# Patient Record
Sex: FEMALE | Race: WHITE | Employment: OTHER
[De-identification: names, ages, dates, MRNs, and addresses within clinical notes are randomized per-mention and may not be internally consistent; named-entity substitution may affect disease eponyms.]

---

## 2017-07-15 ENCOUNTER — HEALTH MAINTENANCE LETTER (OUTPATIENT)
Age: 47
End: 2017-07-15

## 2018-01-20 ENCOUNTER — HEALTH MAINTENANCE LETTER (OUTPATIENT)
Age: 48
End: 2018-01-20

## 2018-02-20 ENCOUNTER — OFFICE VISIT (OUTPATIENT)
Dept: FAMILY MEDICINE | Facility: CLINIC | Age: 48
End: 2018-02-20
Payer: COMMERCIAL

## 2018-02-20 VITALS
HEART RATE: 66 BPM | HEIGHT: 65 IN | OXYGEN SATURATION: 99 % | DIASTOLIC BLOOD PRESSURE: 72 MMHG | WEIGHT: 234.9 LBS | SYSTOLIC BLOOD PRESSURE: 128 MMHG | TEMPERATURE: 98.3 F | BODY MASS INDEX: 39.14 KG/M2

## 2018-02-20 DIAGNOSIS — Z01.818 PREOP GENERAL PHYSICAL EXAM: Primary | ICD-10-CM

## 2018-02-20 DIAGNOSIS — N62 LARGE BREASTS: ICD-10-CM

## 2018-02-20 LAB — HGB BLD-MCNC: 12.9 G/DL (ref 11.7–15.7)

## 2018-02-20 PROCEDURE — 36415 COLL VENOUS BLD VENIPUNCTURE: CPT | Performed by: NURSE PRACTITIONER

## 2018-02-20 PROCEDURE — 99214 OFFICE O/P EST MOD 30 MIN: CPT | Performed by: NURSE PRACTITIONER

## 2018-02-20 PROCEDURE — 85018 HEMOGLOBIN: CPT | Performed by: NURSE PRACTITIONER

## 2018-02-20 NOTE — PROGRESS NOTES
[facility]  [address] [place] [id number] [phone]  Dept: [phone]    PRE-OP EVALUATION:  Today's date: 2018    Nina Sommers (: 1970) presents for pre-operative evaluation assessment as requested by Dr. crandall.  She requires evaluation and anesthesia risk assessment prior to undergoing surgery/procedure for treatment of  Plastic surgery    [phone]  Primary Physician: Marilee Centeno  Type of Anesthesia Anticipated: General    Patient has a Health Care Directive or Living Will:  YES     Preop Questions 2018   Who is doing your surgery? Dr Crandall   What are you having done? plastic surgery   Date of Surgery/Procedure:    Facility or Hospital where procedure/surgery will be performed: [facility]   1.  Do you have a history of Heart attack, stroke, stent, coronary bypass surgery, or other heart surgery? No   2.  Do you ever have any pain or discomfort in your chest? No   3.  Do you have a history of  Heart Failure? No   4.   Are you troubled by shortness of breath when:  walking on a level surface, or up a slight hill, or at night? No   5.  Do you currently have a cold, bronchitis or other respiratory infection? No   6.  Do you have a cough, shortness of breath, or wheezing? No   7.  Do you sometimes get pains in the calves of your legs when you walk? No   8. Do you or anyone in your family have previous history of blood clots? No   9.  Do you or does anyone in your family have a serious bleeding problem such as prolonged bleeding following surgeries or cuts? No   10. Have you ever had problems with anemia or been told to take iron pills? No   11. Have you had any abnormal blood loss such as black, tarry or bloody stools, or abnormal vaginal bleeding? No   12. Have you ever had a blood transfusion? No   13. Have you or any of your relatives ever had problems with anesthesia? No   14. Do you have sleep apnea, excessive snoring or  "daytime drowsiness? No   15. Do you have any prosthetic heart valves? No   16. Do you have prosthetic joints? No   17. Is there any chance that you may be pregnant? No         HPI:     HPI related to upcoming procedure: breast augmentation          MEDICAL HISTORY:     Patient Active Problem List    Diagnosis Date Noted     Family history of coronary artery disease 11/12/2012     Priority: Medium     Hyperlipidemia LDL goal <160 11/12/2012     Priority: Medium     Obesity 11/12/2012     Priority: Medium      History reviewed. No pertinent past medical history.  History reviewed. No pertinent surgical history.  No current outpatient prescriptions on file.     OTC products: None, except as noted above    No Known Allergies   Latex Allergy: NO    Social History   Substance Use Topics     Smoking status: Never Smoker     Smokeless tobacco: Never Used     Alcohol use Yes      Comment: rare     History   Drug Use No       REVIEW OF SYSTEMS:   Constitutional, HEENT, cardiovascular, pulmonary, gi and gu systems are negative, except as otherwise noted.    EXAM:   /72 (BP Location: Right arm, Patient Position: Chair, Cuff Size: Adult Large)  Pulse 66  Temp 98.3  F (36.8  C) (Oral)  Ht 5' 5\" (1.651 m)  Wt 234 lb 14.4 oz (106.5 kg)  LMP 02/16/2018 (Exact Date)  SpO2 99%  Breastfeeding? No  BMI 39.09 kg/m2    GENERAL APPEARANCE: healthy, alert and no distress     EYES: EOMI, PERRL     HENT: ear canals and TM's normal and nose and mouth without ulcers or lesions     NECK: no adenopathy, no asymmetry, masses, or scars and thyroid normal to palpation     RESP: lungs clear to auscultation - no rales, rhonchi or wheezes     CV: regular rates and rhythm, normal S1 S2, no S3 or S4 and no murmur, click or rub     ABDOMEN:  soft, nontender, no HSM or masses and bowel sounds normal     MS: extremities normal- no gross deformities noted, no evidence of inflammation in joints, FROM in all extremities.     SKIN: no suspicious " lesions or rashes     NEURO: Normal strength and tone, sensory exam grossly normal, mentation intact and speech normal     PSYCH: mentation appears normal. and affect normal/bright     LYMPHATICS: No cervical adenopathy    DIAGNOSTICS:   Hemoglobin drawn today.     Recent Labs   Lab Test  03/22/15   1243  11/12/12   1035   HGB  13.4  13.1   PLT  249  282   NA  140  138   POTASSIUM  3.9  4.5   CR  0.97  0.92        IMPRESSION:   Diagnosis/reason for consult: breast augmentation.     The proposed surgical procedure is considered INTERMEDIATE risk.    REVISED CARDIAC RISK INDEX  The patient has the following serious cardiovascular risks for perioperative complications such as (MI, PE, VFib and 3  AV Block):  No serious cardiac risks  INTERPRETATION: 1 risks: Class II (low risk - 0.9% complication rate)    The patient has the following additional risks for perioperative complications:  No identified additional risks      ICD-10-CM    1. Preop general physical exam Z01.818 Hemoglobin   2. Large breasts N62        RECOMMENDATIONS:         --Patient is to take all scheduled medications on the day of surgery EXCEPT for modifications listed below.    APPROVAL GIVEN to proceed with proposed procedure, without further diagnostic evaluation       Signed Electronically by: Marilee Centeno NP    Copy of this evaluation report is provided to requesting physician.    [place] Preop Guidelines

## 2018-02-20 NOTE — NURSING NOTE
"Chief Complaint   Patient presents with     Pre-Op Exam       Initial /72 (BP Location: Right arm, Patient Position: Chair, Cuff Size: Adult Large)  Pulse 66  Temp 98.3  F (36.8  C) (Oral)  Ht 5' 5\" (1.651 m)  Wt 234 lb 14.4 oz (106.5 kg)  LMP 02/16/2018 (Exact Date)  SpO2 99%  Breastfeeding? No  BMI 39.09 kg/m2 Estimated body mass index is 39.09 kg/(m^2) as calculated from the following:    Height as of this encounter: 5' 5\" (1.651 m).    Weight as of this encounter: 234 lb 14.4 oz (106.5 kg).  Medication Reconciliation: complete   Mally Berrios SMA      "

## 2018-02-20 NOTE — NURSING NOTE
Faxed 8 page with labs and ov to [phone] [facility] on 2/19/2018 at 4pm.Guzman Li CMA

## 2018-02-20 NOTE — MR AVS SNAPSHOT
After Visit Summary   2/20/2018    Nina Sommers    MRN: [id number]           Patient Information     Date Of Birth          1970        Visit Information        Provider Department      2/20/2018 11:00 AM Marilee Centeno NP [facility]        Today's Diagnoses     Preop general physical exam    -  1      Care Instructions      Before Your Surgery      Call your surgeon if there is any change in your health. This includes signs of a cold or flu (such as a sore throat, runny nose, cough, rash or fever).    Do not smoke, drink alcohol or take over the counter medicine (unless your surgeon or primary care doctor tells you to) for the 24 hours before and after surgery.    If you take prescribed drugs: Follow your doctor s orders about which medicines to take and which to stop until after surgery.    Eating and drinking prior to surgery: follow the instructions from your surgeon    Take a shower or bath the night before surgery. Use the soap your surgeon gave you to gently clean your skin. If you do not have soap from your surgeon, use your regular soap. Do not shave or scrub the surgery site.  Wear clean pajamas and have clean sheets on your bed.           Follow-ups after your visit        Who to contact     If you have questions or need follow up information about today's clinic visit or your schedule please contact [facility] directly at [phone].  Normal or non-critical lab and imaging results will be communicated to you by MyChart, letter or phone within 4 business days after the clinic has received the results. If you do not hear from us within 7 days, please contact the clinic through MyChart or phone. If you have a critical or abnormal lab result, we will notify you by phone as soon as possible.  Submit refill requests through Bill Me Later or call your pharmacy and they will forward the refill request to us. Please allow 3 business days for your refill to be  "completed.          Additional Information About Your Visit        InTownhart Information     [employer] gives you secure access to your electronic health record. If you see a primary care provider, you can also send messages to your care team and make appointments. If you have questions, please call your primary care clinic.  If you do not have a primary care provider, please call [phone] and they will assist you.        Care EveryWhere ID     This is your Care EveryWhere ID. This could be used by other organizations to access your [place] medical records  [id number]        Your Vitals Were     Pulse Temperature Height Last Period Pulse Oximetry Breastfeeding?    66 98.3  F (36.8  C) (Oral) 5' 5\" (1.651 m) 02/16/2018 (Exact Date) 99% No    BMI (Body Mass Index)                   39.09 kg/m2            Blood Pressure from Last 3 Encounters:   02/20/18 128/72   03/22/15 128/74   11/12/12 126/72    Weight from Last 3 Encounters:   02/20/18 234 lb 14.4 oz (106.5 kg)   03/22/15 238 lb (108 kg)   11/12/12 235 lb (106.6 kg)              We Performed the Following     Hemoglobin        Primary Care Provider Office Phone # Fax #    Marilee Centeno [phone]       [facility], [place] [address] [facility] [address]        Equal Access to Services     DAREK FOWLER : Hadii aad ku hadasho Soomaali, waaxda luqadaha, qaybta kaalmada adeegyada, fabi wilson. So [facility] [phone].    ATENCIÓN: Si habla español, tiene a maardiaga disposición servicios gratuitos de asistencia lingüística. Llame al [phone].    We comply with applicable federal civil rights laws and [place] laws. We do not discriminate on the basis of race, color, national origin, age, disability, sex, sexual orientation, or gender identity.            Thank you!     Thank you for choosing [facility]  for your care. Our goal is always to provide you with excellent care. Hearing back from our " patients is one way we can continue to improve our services. Please take a few minutes to complete the written survey that you may receive in the mail after your visit with us. Thank you!             Your Updated Medication List - Protect others around you: Learn how to safely use, store and throw away your medicines at www.disposemymeds.org.      Notice  As of 2/20/2018 11:31 AM    You have not been prescribed any medications.

## 2018-04-30 ENCOUNTER — OFFICE VISIT (OUTPATIENT)
Dept: URGENT CARE | Facility: URGENT CARE | Age: 48
End: 2018-04-30
Payer: COMMERCIAL

## 2018-04-30 VITALS
OXYGEN SATURATION: 98 % | HEART RATE: 84 BPM | SYSTOLIC BLOOD PRESSURE: 139 MMHG | TEMPERATURE: 98.3 F | DIASTOLIC BLOOD PRESSURE: 90 MMHG

## 2018-04-30 DIAGNOSIS — M54.50 ACUTE LEFT-SIDED LOW BACK PAIN WITHOUT SCIATICA: Primary | ICD-10-CM

## 2018-04-30 PROCEDURE — 99213 OFFICE O/P EST LOW 20 MIN: CPT | Performed by: PHYSICIAN ASSISTANT

## 2018-04-30 RX ORDER — CYCLOBENZAPRINE HCL 10 MG
10 TABLET ORAL 3 TIMES DAILY PRN
Qty: 30 TABLET | Refills: 1 | Status: SHIPPED | OUTPATIENT
Start: 2018-04-30 | End: 2022-09-16

## 2018-04-30 NOTE — MR AVS SNAPSHOT
After Visit Summary   4/30/2018    Nina Sommers    MRN: [id number]           Patient Information     Date Of Birth          1970        Visit Information        Provider Department      4/30/2018 7:45 PM Alla Hawkins PA-C [facility]        Today's Diagnoses     Acute left-sided low back pain without sciatica    -  1       Follow-ups after your visit        Who to contact     If you have questions or need follow up information about today's clinic visit or your schedule please contact [facility] directly at [phone].  Normal or non-critical lab and imaging results will be communicated to you by Long Playhart, letter or phone within 4 business days after the clinic has received the results. If you do not hear from us within 7 days, please contact the clinic through SandForcet or phone. If you have a critical or abnormal lab result, we will notify you by phone as soon as possible.  Submit refill requests through [employer]d or call your pharmacy and they will forward the refill request to us. Please allow 3 business days for your refill to be completed.          Additional Information About Your Visit        MyChart Information     [employer] gives you secure access to your electronic health record. If you see a primary care provider, you can also send messages to your care team and make appointments. If you have questions, please call your primary care clinic.  If you do not have a primary care provider, please call [phone] and they will assist you.        Care EveryWhere ID     This is your Care EveryWhere ID. This could be used by other organizations to access your [place] medical records  [id number]        Your Vitals Were     Pulse Temperature Pulse Oximetry             84 98.3  F (36.8  C) (Oral) 98%          Blood Pressure from Last 3 Encounters:   04/30/18 139/90   02/20/18 128/72   03/22/15 128/74    Weight from Last 3 Encounters:   02/20/18 234  lb 14.4 oz (106.5 kg)   03/22/15 238 lb (108 kg)   11/12/12 235 lb (106.6 kg)              Today, you had the following     No orders found for display         Today's Medication Changes          These changes are accurate as of 4/30/18 10:09 PM.  If you have any questions, ask your nurse or doctor.               Start taking these medicines.        Dose/Directions    cyclobenzaprine 10 MG tablet   Commonly known as:  FLEXERIL   Used for:  Acute left-sided low back pain without sciatica        Dose:  10 mg   Take 1 tablet (10 mg) by mouth 3 times daily as needed for muscle spasms   Quantity:  30 tablet   Refills:  1            Where to get your medicines      These medications were sent to [facility] [address] [place] [address] [facility] [address] [address] [facility] [address]     Phone:  [phone]     cyclobenzaprine 10 MG tablet                Primary Care Provider Office Phone # Fax #    Marileelaura Centeno [phone]       [facility] [address] [address] [facility] [address]        Equal Access to Services     DAREK FOWLER AH: Hadii aad ku hadasho Soomaali, waaxda luqadaha, qaybta kaalmada adeegyada, waxay lacy haydeen arielle buitrago . So [facility] [phone].    ATENCIÓN: Si habla español, tiene a maradiaga disposición servicios gratuitos de asistencia lingüística. Llame al [phone].    We comply with applicable federal civil rights laws and [place] laws. We do not discriminate on the basis of race, color, national origin, age, disability, sex, sexual orientation, or gender identity.            Thank you!     Thank you for choosing [facility]  for your care. Our goal is always to provide you with excellent care. Hearing back from our patients is one way we can continue to improve our services. Please take a few minutes to complete the written survey that you may receive in the mail after your visit with us. Thank you!              Your Updated Medication List - Protect others around you: Learn how to safely use, store and throw away your medicines at www.disposemymeds.org.          This list is accurate as of 4/30/18 10:09 PM.  Always use your most recent med list.                   Brand Name Dispense Instructions for use Diagnosis    cyclobenzaprine 10 MG tablet    FLEXERIL    30 tablet    Take 1 tablet (10 mg) by mouth 3 times daily as needed for muscle spasms    Acute left-sided low back pain without sciatica

## 2018-05-01 NOTE — PROGRESS NOTES
SUBJECTIVE:   Nina Sommers is a 48 year old female presenting with a chief complaint of   Chief Complaint   Patient presents with     Urgent Care     Musculoskeletal Problem     Low back pain started on 18- muscle tightness more on L side       She is an established patient of [facility].    Patient presenting to urgent care [facility] with a complaint of left-sided low back pain that started 5 days ago. She notes muscle tightness on the lower left back.  She denies any injury or trauma.  Has tried some heating pad.  Earlier today notes that she was not able to get out of bed due to the muscle tightness.  Currently no symptoms.  She has been stretching.  Denies any loss of bowel or bladder function.  Reports  occasional numbness left lower leg.    Review of Systems   Positive for left-sided back pain  Positive for numbness occasional(left lower leg.)    No past medical history on file.  Family History   Problem Relation Age of Onset     Myocardial Infarction Mother       from heart attack at age of 73     DIABETES Mother      type II     Family History Negative Father      macular degeneration     Breast Cancer Maternal Grandmother      in her 70's      Family History Negative Sister      2-doing well     Cancer - colorectal Other      paternal aunt,  from this in her 60-70's-dad's sister     Current Outpatient Prescriptions   Medication Sig Dispense Refill     cyclobenzaprine (FLEXERIL) 10 MG tablet Take 1 tablet (10 mg) by mouth 3 times daily as needed for muscle spasms 30 tablet 1     Social History   Substance Use Topics     Smoking status: Never Smoker     Smokeless tobacco: Never Used     Alcohol use Yes      Comment: rare       OBJECTIVE  /90 (BP Location: Right arm, Patient Position: Chair, Cuff Size: Adult Large)  Pulse 84  Temp 98.3  F (36.8  C) (Oral)  SpO2 98%    Physical Exam  General appearance: 48-year-old female in no acute distress  Lungs are clear to auscultation  bilaterally.  Chest with normal heart tones S1-S2.  Back exam: no gross deformities, swelling or ecchymosis noted, currently no tenderness to palpation elicited, range of motion is appropriate, strength is normal,  hip extension and flexion is appropriate, gait is normal.  No tenderness to palpation over the lumbar sacral spine.    Labs:  No results found for this or any previous visit (from the past 24 hour(s)).    X-Ray was not done.    ASSESSMENT:      ICD-10-CM    1. Acute left-sided low back pain without sciatica M54.5 cyclobenzaprine (FLEXERIL) 10 MG tablet        Medical Decision Making:    Differential Diagnosis:  Low back pain    Serious Comorbid Conditions:  none    PLAN:  Acute left-sided low back pain: Patient currently not experiencing any symptoms in urgent care.  A prescription of Flexeril is written for her to take as needed should symptoms reoccur.  Follow-up sooner if any worsening symptoms.  Patient understands and agrees with the plan.      Followup:    If not improving or if condition worsens, follow up with your Primary Care Provider    There are no Patient Instructions on file for this visit.

## 2018-05-07 ENCOUNTER — TELEPHONE (OUTPATIENT)
Dept: FAMILY MEDICINE | Facility: CLINIC | Age: 48
End: 2018-05-07

## 2018-05-07 NOTE — TELEPHONE ENCOUNTER
Panel Management Review      Patient has the following on her problem list: None      Composite cancer screening  Chart review shows that this patient is due/due soon for the following Pap Smear and Mammogram  Summary:    Patient is due/failing the following:   MAMMOGRAM and PAP    Action needed:   Patient needs office visit for physical w/pap. and Patient needs referral/order: mammogram    Type of outreach:    Phone, left message for patient to call back.     Questions for provider review:    None                                                                                                                                    ENRICO Latham       Chart routed to  .

## 2018-07-22 ENCOUNTER — HEALTH MAINTENANCE LETTER (OUTPATIENT)
Age: 48
End: 2018-07-22

## 2019-02-15 ENCOUNTER — HEALTH MAINTENANCE LETTER (OUTPATIENT)
Age: 49
End: 2019-02-15

## 2019-11-03 ENCOUNTER — HEALTH MAINTENANCE LETTER (OUTPATIENT)
Age: 49
End: 2019-11-03

## 2020-03-09 ENCOUNTER — ANCILLARY PROCEDURE (OUTPATIENT)
Dept: GENERAL RADIOLOGY | Facility: CLINIC | Age: 50
End: 2020-03-09
Attending: PHYSICIAN ASSISTANT
Payer: COMMERCIAL

## 2020-03-09 ENCOUNTER — ANCILLARY PROCEDURE (OUTPATIENT)
Dept: MAMMOGRAPHY | Facility: CLINIC | Age: 50
End: 2020-03-09
Payer: COMMERCIAL

## 2020-03-09 ENCOUNTER — OFFICE VISIT (OUTPATIENT)
Dept: FAMILY MEDICINE | Facility: CLINIC | Age: 50
End: 2020-03-09
Payer: COMMERCIAL

## 2020-03-09 VITALS
SYSTOLIC BLOOD PRESSURE: 132 MMHG | HEIGHT: 68 IN | TEMPERATURE: 97.6 F | OXYGEN SATURATION: 98 % | HEART RATE: 69 BPM | BODY MASS INDEX: 34.74 KG/M2 | DIASTOLIC BLOOD PRESSURE: 83 MMHG | WEIGHT: 229.2 LBS

## 2020-03-09 DIAGNOSIS — M25.562 ACUTE PAIN OF LEFT KNEE: ICD-10-CM

## 2020-03-09 DIAGNOSIS — Z12.31 VISIT FOR SCREENING MAMMOGRAM: ICD-10-CM

## 2020-03-09 DIAGNOSIS — Z00.00 ROUTINE GENERAL MEDICAL EXAMINATION AT A HEALTH CARE FACILITY: Primary | ICD-10-CM

## 2020-03-09 DIAGNOSIS — Z13.0 SCREENING FOR BLOOD DISEASE: ICD-10-CM

## 2020-03-09 DIAGNOSIS — Z13.1 SCREENING FOR DIABETES MELLITUS: ICD-10-CM

## 2020-03-09 DIAGNOSIS — E78.5 HYPERLIPIDEMIA LDL GOAL <160: ICD-10-CM

## 2020-03-09 PROCEDURE — 90471 IMMUNIZATION ADMIN: CPT | Performed by: PHYSICIAN ASSISTANT

## 2020-03-09 PROCEDURE — 77067 SCR MAMMO BI INCL CAD: CPT | Mod: TC

## 2020-03-09 PROCEDURE — 99396 PREV VISIT EST AGE 40-64: CPT | Mod: 25 | Performed by: PHYSICIAN ASSISTANT

## 2020-03-09 PROCEDURE — 77063 BREAST TOMOSYNTHESIS BI: CPT | Mod: TC

## 2020-03-09 PROCEDURE — 90715 TDAP VACCINE 7 YRS/> IM: CPT | Performed by: PHYSICIAN ASSISTANT

## 2020-03-09 PROCEDURE — 73562 X-RAY EXAM OF KNEE 3: CPT | Mod: LT

## 2020-03-09 ASSESSMENT — ENCOUNTER SYMPTOMS
CONSTIPATION: 0
FEVER: 0
NERVOUS/ANXIOUS: 0
CHILLS: 0
DIARRHEA: 0
ABDOMINAL PAIN: 0
EYE PAIN: 0
HEMATURIA: 0
FREQUENCY: 0
HEMATOCHEZIA: 0
DIZZINESS: 0
COUGH: 0

## 2020-03-09 ASSESSMENT — MIFFLIN-ST. JEOR: SCORE: 1723.14

## 2020-03-09 NOTE — PROGRESS NOTES
Prior to immunization administration, verified patients identity using patient s name and date of birth. Please see Immunization Activity for additional information.     Screening Questionnaire for Adult Immunization    Are you sick today?   No   Do you have allergies to medications, food, a vaccine component or latex?   No   Have you ever had a serious reaction after receiving a vaccination?   No   Do you have a long-term health problem with heart, lung, kidney, or metabolic disease (e.g., diabetes), asthma, a blood disorder, no spleen, complement component deficiency, a cochlear implant, or a spinal fluid leak?  Are you on long-term aspirin therapy?   No   Do you have cancer, leukemia, HIV/AIDS, or any other immune system problem?   No   Do you have a parent, brother, or sister with an immune system problem?   No   In the past 3 months, have you taken medications that affect  your immune system, such as prednisone, other steroids, or anticancer drugs; drugs for the treatment of rheumatoid arthritis, Crohn s disease, or psoriasis; or have you had radiation treatments?   No   Have you had a seizure, or a brain or other nervous system problem?   No   During the past year, have you received a transfusion of blood or blood    products, or been given immune (gamma) globulin or antiviral drug?   No   For women: Are you pregnant or is there a chance you could become       pregnant during the next month?   No   Have you received any vaccinations in the past 4 weeks?   No     Immunization questionnaire answers were all negative.        Per orders of Dr. Nath, injection of tdap given by Yolanda Gardner CMA. Patient instructed to remain in clinic for 15 minutes afterwards, and to report any adverse reaction to me immediately.       Screening performed by Yolanda Gardner CMA on 3/9/2020 at 9:14 AM.

## 2020-03-09 NOTE — PROGRESS NOTES
SUBJECTIVE:   CC: Nina Sommers is an 49 year old woman who presents for preventive health visit.     Healthy Habits:     Getting at least 3 servings of Calcium per day:  Yes    Bi-annual eye exam:  NO    Dental care twice a year:  Yes    Sleep apnea or symptoms of sleep apnea:  None    Diet:  Carbohydrate counting and Breakfast skipped    Duration of exercise:  30-45 minutes    Taking medications regularly:  Yes    Medication side effects:  None    PHQ-2 Total Score: 0    Additional concerns today:  Yes           left knee pain  - nerve pain    Today's PHQ-2 Score:   PHQ-2 ( 1999 [employer]) 3/9/2020   Q1: Little interest or pleasure in doing things 0   Q2: Feeling down, depressed or hopeless 0   PHQ-2 Score 0   Q1: Little interest or pleasure in doing things Not at all   Q2: Feeling down, depressed or hopeless Not at all   PHQ-2 Score 0       Abuse: Current or Past(Physical, Sexual or Emotional)- No  Do you feel safe in your environment? Yes        Social History     Tobacco Use     Smoking status: Never Smoker     Smokeless tobacco: Never Used   Substance Use Topics     Alcohol use: Yes     Comment: rare     If you drink alcohol do you typically have >3 drinks per day or >7 drinks per week? No    Alcohol Use 3/9/2020   Prescreen: >3 drinks/day or >7 drinks/week? No   Prescreen: >3 drinks/day or >7 drinks/week? -   No flowsheet data found.    Reviewed orders with patient.  Reviewed health maintenance and updated orders accordingly - Yes  BP Readings from Last 3 Encounters:   03/09/20 132/83   04/30/18 139/90   02/20/18 128/72    Wt Readings from Last 3 Encounters:   03/09/20 104 kg (229 lb 3.2 oz)   02/20/18 106.5 kg (234 lb 14.4 oz)   03/22/15 108 kg (238 lb)                  No Known Allergies  Recent Labs   Lab Test 03/22/15  1243 11/12/12  1035   LDL  --  83   HDL  --  54   TRIG  --  54   ALT 48 21   CR 0.97 0.92   GFRESTIMATED 63 68   GFRESTBLACK 76 82   POTASSIUM 3.9 4.5   TSH  --  2.08        Mammogram  Screening: Patient under age 50, mutual decision reflected in health maintenance.      Pertinent mammograms are reviewed under the imaging tab.  History of abnormal Pap smear: NO - age 30-65 PAP every 5 years with negative HPV co-testing recommended  PAP / HPV 11/12/2012   PAP NIL     Reviewed and updated as needed this visit by clinical staff         Reviewed and updated as needed this visit by Provider            Review of Systems  CONSTITUTIONAL: NEGATIVE for fever, chills, change in weight  INTEGUMENTARU/SKIN: NEGATIVE for worrisome rashes, moles or lesions  EYES: NEGATIVE for vision changes or irritation  ENT: NEGATIVE for ear, mouth and throat problems  RESP: NEGATIVE for significant cough or SOB  BREAST: NEGATIVE for masses, tenderness or discharge  CV: NEGATIVE for chest pain, palpitations or peripheral edema  GI: NEGATIVE for nausea, abdominal pain, heartburn, or change in bowel habits  : NEGATIVE for unusual urinary or vaginal symptoms. Periods are regular.  MUSCULOSKELETAL: NEGATIVE for significant arthralgias or myalgia  NEURO: NEGATIVE for weakness, dizziness or paresthesias  PSYCHIATRIC: NEGATIVE for changes in mood or affect     OBJECTIVE:   There were no vitals taken for this visit.  Physical Exam  GENERAL: healthy, alert and no distress  EYES: Eyes grossly normal to inspection, PERRL and conjunctivae and sclerae normal  HENT: ear canals and TM's normal, nose and mouth without ulcers or lesions  NECK: no adenopathy, no asymmetry, masses, or scars and thyroid normal to palpation  RESP: lungs clear to auscultation - no rales, rhonchi or wheezes  BREAST: normal without masses, tenderness or nipple discharge and no palpable axillary masses or adenopathy  CV: regular rate and rhythm, normal S1 S2, no S3 or S4, no murmur, click or rub, no peripheral edema and peripheral pulses strong  ABDOMEN: soft, nontender, no hepatosplenomegaly, no masses and bowel sounds normal  MS: no gross musculoskeletal defects  "noted, no edema  SKIN: no suspicious lesions or rashes  NEURO: Normal strength and tone, mentation intact and speech normal  PSYCH: mentation appears normal, affect normal/bright  LYMPH: no cervical, supraclavicular, axillary, or inguinal adenopathy    Diagnostic Test Results:  Labs reviewed in Epic    ASSESSMENT/PLAN:   1. Routine general medical examination at a health care facility      2. Hyperlipidemia LDL goal <160    - Lipid panel reflex to direct LDL Fasting; Future    3. Screening for blood disease    - CBC with platelets; Future    4. Screening for diabetes mellitus    - Comprehensive metabolic panel; Future    5. Acute pain of left knee  Xray is within normal limits      - XR Knee Left 3 Views; Future    COUNSELING:  Reviewed preventive health counseling, as reflected in patient instructions       Regular exercise       Healthy diet/nutrition       Vision screening       Hearing screening       (Magi)menopause management    Estimated body mass index is 39.09 kg/m  as calculated from the following:    Height as of 2/20/18: 1.651 m (5' 5\").    Weight as of 2/20/18: 106.5 kg (234 lb 14.4 oz).    Weight management plan: Discussed healthy diet and exercise guidelines     reports that she has never smoked. She has never used smokeless tobacco.      Counseling Resources:  ATP IV Guidelines  Pooled Cohorts Equation Calculator  Breast Cancer Risk Calculator  FRAX Risk Assessment  ICSI Preventive Guidelines  Dietary Guidelines for Americans, 2010  USDA's MyPlate  ASA Prophylaxis  Lung CA Screening    Ramona Ann Aaseby-Aguilera, PA-C  [facility]  "

## 2020-04-02 ENCOUNTER — OFFICE VISIT (OUTPATIENT)
Dept: FAMILY MEDICINE | Facility: CLINIC | Age: 50
End: 2020-04-02
Payer: COMMERCIAL

## 2020-04-02 VITALS
DIASTOLIC BLOOD PRESSURE: 80 MMHG | BODY MASS INDEX: 34.56 KG/M2 | OXYGEN SATURATION: 99 % | HEART RATE: 73 BPM | TEMPERATURE: 98.1 F | HEIGHT: 68 IN | SYSTOLIC BLOOD PRESSURE: 122 MMHG | WEIGHT: 228 LBS

## 2020-04-02 DIAGNOSIS — Z11.51 SCREENING FOR HUMAN PAPILLOMAVIRUS: Primary | ICD-10-CM

## 2020-04-02 DIAGNOSIS — Z13.1 SCREENING FOR DIABETES MELLITUS: ICD-10-CM

## 2020-04-02 DIAGNOSIS — E78.5 HYPERLIPIDEMIA LDL GOAL <160: ICD-10-CM

## 2020-04-02 DIAGNOSIS — Z13.0 SCREENING FOR BLOOD DISEASE: ICD-10-CM

## 2020-04-02 LAB
ALBUMIN SERPL-MCNC: 3.5 G/DL (ref 3.4–5)
ALP SERPL-CCNC: 57 U/L (ref 40–150)
ALT SERPL W P-5'-P-CCNC: 24 U/L (ref 0–50)
ANION GAP SERPL CALCULATED.3IONS-SCNC: 2 MMOL/L (ref 3–14)
AST SERPL W P-5'-P-CCNC: 14 U/L (ref 0–45)
BILIRUB SERPL-MCNC: 0.6 MG/DL (ref 0.2–1.3)
BUN SERPL-MCNC: 13 MG/DL (ref 7–30)
CALCIUM SERPL-MCNC: 8.2 MG/DL (ref 8.5–10.1)
CHLORIDE SERPL-SCNC: 108 MMOL/L (ref 94–109)
CHOLEST SERPL-MCNC: 179 MG/DL
CO2 SERPL-SCNC: 28 MMOL/L (ref 20–32)
CREAT SERPL-MCNC: 0.95 MG/DL (ref 0.52–1.04)
ERYTHROCYTE [DISTWIDTH] IN BLOOD BY AUTOMATED COUNT: 13.7 % (ref 10–15)
GFR SERPL CREATININE-BSD FRML MDRD: 71 ML/MIN/{1.73_M2}
GLUCOSE SERPL-MCNC: 100 MG/DL (ref 70–99)
HCT VFR BLD AUTO: 40.2 % (ref 35–47)
HDLC SERPL-MCNC: 55 MG/DL
HGB BLD-MCNC: 13.3 G/DL (ref 11.7–15.7)
LDLC SERPL CALC-MCNC: 114 MG/DL
MCH RBC QN AUTO: 29.8 PG (ref 26.5–33)
MCHC RBC AUTO-ENTMCNC: 33.1 G/DL (ref 31.5–36.5)
MCV RBC AUTO: 90 FL (ref 78–100)
NONHDLC SERPL-MCNC: 124 MG/DL
PLATELET # BLD AUTO: 264 10E9/L (ref 150–450)
POTASSIUM SERPL-SCNC: 4.5 MMOL/L (ref 3.4–5.3)
PROT SERPL-MCNC: 7.1 G/DL (ref 6.8–8.8)
RBC # BLD AUTO: 4.47 10E12/L (ref 3.8–5.2)
SODIUM SERPL-SCNC: 138 MMOL/L (ref 133–144)
TRIGL SERPL-MCNC: 52 MG/DL
WBC # BLD AUTO: 6.2 10E9/L (ref 4–11)

## 2020-04-02 PROCEDURE — 87624 HPV HI-RISK TYP POOLED RSLT: CPT | Performed by: PHYSICIAN ASSISTANT

## 2020-04-02 PROCEDURE — 99213 OFFICE O/P EST LOW 20 MIN: CPT | Performed by: PHYSICIAN ASSISTANT

## 2020-04-02 PROCEDURE — G0145 SCR C/V CYTO,THINLAYER,RESCR: HCPCS | Performed by: PHYSICIAN ASSISTANT

## 2020-04-02 PROCEDURE — 80053 COMPREHEN METABOLIC PANEL: CPT | Performed by: PHYSICIAN ASSISTANT

## 2020-04-02 PROCEDURE — 80061 LIPID PANEL: CPT | Performed by: PHYSICIAN ASSISTANT

## 2020-04-02 PROCEDURE — 85027 COMPLETE CBC AUTOMATED: CPT | Performed by: PHYSICIAN ASSISTANT

## 2020-04-02 PROCEDURE — 36415 COLL VENOUS BLD VENIPUNCTURE: CPT | Performed by: PHYSICIAN ASSISTANT

## 2020-04-02 ASSESSMENT — MIFFLIN-ST. JEOR: SCORE: 1717.7

## 2020-04-02 NOTE — PROGRESS NOTES
Subjective     PCP wore a mask and gloves.  Patient unmasked      Nina Sommers is a 49 year old female who presents to clinic today for the following health issues:    HPI    Pap only.  Hasnt had pap in years.  Had one abnormal 20 years ago but didn't need anything other than repeat pap and was normal then .          Current Outpatient Medications   Medication Sig Dispense Refill     cyclobenzaprine (FLEXERIL) 10 MG tablet Take 1 tablet (10 mg) by mouth 3 times daily as needed for muscle spasms (Patient not taking: Reported on 3/9/2020) 30 tablet 1     BP Readings from Last 3 Encounters:   04/02/20 122/80   03/09/20 132/83   04/30/18 139/90    Wt Readings from Last 3 Encounters:   04/02/20 103.4 kg (228 lb)   03/09/20 104 kg (229 lb 3.2 oz)   02/20/18 106.5 kg (234 lb 14.4 oz)                      Reviewed and updated as needed this visit by Provider         Review of Systems   ROS COMP: Constitutional, HEENT, cardiovascular, pulmonary, gi and gu systems are negative, except as otherwise noted.      Objective    LMP 03/08/2020   There is no height or weight on file to calculate BMI.  Physical Exam   GENERAL: healthy, alert and no distress  RESP: lungs clear to auscultation - no rales, rhonchi or wheezes  CV: regular rate and rhythm, normal S1 S2, no S3 or S4, no murmur, click or rub, no peripheral edema and peripheral pulses strong   (female): normal female external genitalia, normal urethral meatus, vaginal mucosa, normal cervix/adnexa/uterus without masses or discharge    Diagnostic Test Results:  Labs reviewed in Epic        Assessment & Plan     1. Screening for human papillomavirus    - Pap imaged thin layer screen with HPV - recommended age 30 - 65 years (select HPV order below)  - HPV High Risk Types DNA Cervical    2. Hyperlipidemia LDL goal <160    - Lipid panel reflex to direct LDL Fasting    3. Screening for blood disease    - CBC with platelets    4. Screening for diabetes mellitus    - Comprehensive  metabolic panel       See Patient Instructions    No follow-ups on file.    Ramona Ann Aaseby-Aguilera, PA-C  [facility]

## 2020-04-06 LAB
COPATH REPORT: NORMAL
PAP: NORMAL

## 2020-04-07 ENCOUNTER — MYC MEDICAL ADVICE (OUTPATIENT)
Dept: FAMILY MEDICINE | Facility: CLINIC | Age: 50
End: 2020-04-07

## 2020-04-07 LAB
FINAL DIAGNOSIS: NORMAL
HPV HR 12 DNA CVX QL NAA+PROBE: NEGATIVE
HPV16 DNA SPEC QL NAA+PROBE: NEGATIVE
HPV18 DNA SPEC QL NAA+PROBE: NEGATIVE
SPECIMEN DESCRIPTION: NORMAL
SPECIMEN SOURCE CVX/VAG CYTO: NORMAL

## 2020-07-01 ENCOUNTER — MYC MEDICAL ADVICE (OUTPATIENT)
Dept: FAMILY MEDICINE | Facility: CLINIC | Age: 50
End: 2020-07-01

## 2020-07-01 DIAGNOSIS — M25.562 CHRONIC PAIN OF LEFT KNEE: Primary | ICD-10-CM

## 2020-07-01 DIAGNOSIS — G89.29 CHRONIC PAIN OF LEFT KNEE: Primary | ICD-10-CM

## 2020-07-10 ENCOUNTER — OFFICE VISIT (OUTPATIENT)
Dept: ORTHOPEDICS | Facility: CLINIC | Age: 50
End: 2020-07-10
Attending: PHYSICIAN ASSISTANT
Payer: COMMERCIAL

## 2020-07-10 VITALS
HEIGHT: 68 IN | WEIGHT: 223 LBS | DIASTOLIC BLOOD PRESSURE: 78 MMHG | SYSTOLIC BLOOD PRESSURE: 122 MMHG | BODY MASS INDEX: 33.8 KG/M2

## 2020-07-10 DIAGNOSIS — G89.29 CHRONIC PAIN OF LEFT KNEE: ICD-10-CM

## 2020-07-10 DIAGNOSIS — M17.12 PRIMARY OSTEOARTHRITIS OF LEFT KNEE: Primary | ICD-10-CM

## 2020-07-10 DIAGNOSIS — M25.562 CHRONIC PAIN OF LEFT KNEE: ICD-10-CM

## 2020-07-10 PROCEDURE — 99203 OFFICE O/P NEW LOW 30 MIN: CPT | Mod: 25 | Performed by: FAMILY MEDICINE

## 2020-07-10 PROCEDURE — 20611 DRAIN/INJ JOINT/BURSA W/US: CPT | Mod: LT | Performed by: FAMILY MEDICINE

## 2020-07-10 RX ORDER — METHYLPREDNISOLONE ACETATE 40 MG/ML
40 INJECTION, SUSPENSION INTRA-ARTICULAR; INTRALESIONAL; INTRAMUSCULAR; SOFT TISSUE
Status: DISCONTINUED | OUTPATIENT
Start: 2020-07-10 | End: 2022-11-14

## 2020-07-10 RX ADMIN — METHYLPREDNISOLONE ACETATE 40 MG: 40 INJECTION, SUSPENSION INTRA-ARTICULAR; INTRALESIONAL; INTRAMUSCULAR; SOFT TISSUE at 12:01

## 2020-07-10 ASSESSMENT — MIFFLIN-ST. JEOR: SCORE: 1690.02

## 2020-07-10 NOTE — PATIENT INSTRUCTIONS
1. Primary osteoarthritis of left knee    2. Chronic pain of left knee      -Patient has left knee pain, swelling and stiffness due to aggravation of arthritis  -Patient tolerated aspiration cortisone injection today without complications.  -She will start formal physical therapy and home exercise program  -Patient may continue to work and participate in physical activities as tolerated  -Patient may continue with Advil as needed for pain and swelling  -Patient will follow-up in 4 weeks for reevaluation and progression of activity  -Call direct clinic number [[phone]] at any time with questions or concerns.    Albert Yeo MD [facility]  [facility] [place]

## 2020-07-10 NOTE — PROGRESS NOTES
"ASSESSMENT & PLAN  Patient Instructions     1. Primary osteoarthritis of left knee    2. Chronic pain of left knee      -Patient has left knee pain, swelling and stiffness due to aggravation of arthritis  -Patient tolerated aspiration cortisone injection today without complications.  -She will start formal physical therapy and home exercise program  -Patient may continue to work and participate in physical activities as tolerated  -Patient may continue with Advil as needed for pain and swelling  -Patient will follow-up in 4 weeks for reevaluation and progression of activity  -Call direct clinic number [[phone]] at any time with questions or concerns.    Albert Yeo MD [facility]  [facility]          -----    SUBJECTIVE  Nina Sommers is a/an 50 year old female who is seen in consultation at the request of  Ramona Ann Aaseby-Aguil* PA-C for evaluation of left knee pain. The patient is seen by themselves.    Onset: 7 month(s) ago. Patient describes injury as kneeling down hard on cement. Will get bruising pain, with pain and stiffness and an occasional \"zing of pain\"  Location of Pain: left anterior knee pain, with some numb feeling over head of fibula  Rating of Pain at worst: 4/10  Rating of Pain Currently: 2/10  Worsened by: kneeling, getting knee bumped, lunging, stepping up or down, twisting. Standing for long periods will bring on the numb feeling, resting with leg straight  Better with: advil,   Treatments tried: elevation, ibuprofen and previous imaging (xray 3/9/20)  Associated symptoms: swelling, numbness, warmth and feeling of instability  Orthopedic history: NO  Relevant surgical history: NO  Social history: social history: works retail    History reviewed. No pertinent past medical history.  Social History     Socioeconomic History     Marital status:      Spouse name: Not on file     Number of children: Not on file     Years of " "education: Not on file     Highest education level: Not on file   Occupational History     Not on file   Social Needs     Financial resource strain: Not on file     Food insecurity     Worry: Not on file     Inability: Not on file     Transportation needs     Medical: Not on file     Non-medical: Not on file   Tobacco Use     Smoking status: Never Smoker     Smokeless tobacco: Never Used   Substance and Sexual Activity     Alcohol use: Yes     Comment: rare     Drug use: No     Sexual activity: Yes     Partners: Male     Comment:  with vasectomy   Lifestyle     Physical activity     Days per week: Not on file     Minutes per session: Not on file     Stress: Not on file   Relationships     Social connections     Talks on phone: Not on file     Gets together: Not on file     Attends Congregation service: Not on file     Active member of club or organization: Not on file     Attends meetings of clubs or organizations: Not on file     Relationship status: Not on file     Intimate partner violence     Fear of current or ex partner: Not on file     Emotionally abused: Not on file     Physically abused: Not on file     Forced sexual activity: Not on file   Other Topics Concern     Parent/sibling w/ CABG, MI or angioplasty before 65F 55M? 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    REVIEW OF SYSTEMS:  10 point ROS is negative other than symptoms noted above in HPI, Past Medical History or as stated below  Constitutional: NEGATIVE for fever, chills, change in weight  Skin: NEGATIVE for worrisome rashes, moles or lesions  GI/: NEGATIVE for bowel or bladder changes  Neuro: NEGATIVE for weakness, dizziness or paresthesias    OBJECTIVE:  /78   Ht 1.727 m (5' 8\")   Wt 101.2 kg (223 lb)   BMI 33.91 kg/m     General: healthy, " alert and in no distress  HEENT: no scleral icterus or conjunctival erythema  Skin: no suspicious lesions or rash. No jaundice.  CV: no pedal edema  Resp: normal respiratory effort without conversational dyspnea   Psych: normal mood and affect  Gait: normal steady gait with appropriate coordination and balance  Neuro: Normal light sensory exam of lower extremity  MSK:  LEFT KNEE  Inspection:    normal alignment  Palpation:    Tender about the medial patellar facet, lateral joint line and medial joint line. Remainder of bony and ligamentous landmarks are nontender.    Mild effusion is present    Patellofemoral crepitus is Present  Range of Motion:     00 extension to 1000 flexion  Strength:    Quadriceps grossly intact and hamstrings grossly intact    Extensor mechanism intact  Special Tests:    Positive: none    Negative: MCL/valgus stress (0 & 30 deg), LCL/varus stress (0 & 30 deg), Lachman's, anterior drawer, posterior drawer, Silvia's    Large Joint Injection/Arthocentesis: L knee joint    Date/Time: 7/10/2020 12:01 PM  Performed by: Yeo, Albert, MD  Authorized by: Yeo, Albert, MD     Indications:  Pain and osteoarthritis  Needle Size:  22 G  Guidance: ultrasound    Approach:  Superolateral  Location:  Knee      Medications:  40 mg methylPREDNISolone 40 MG/ML  Aspirate amount (mL):  12  Aspirate:  Yellow  Outcome:  Tolerated well, no immediate complications  Procedure discussed: discussed risks, benefits, and alternatives    Consent Given by:  Patient  Timeout: timeout called immediately prior to procedure    Prep: patient was prepped and draped in usual sterile fashion            Independent visualization of the below image:  No results found for this or any previous visit (from the past 24 hour(s)).  KNEE LEFT THREE VIEWS March 9, 2020 8:45 AM      HISTORY: Acute pain of left knee.                                                                      IMPRESSION: Mild patellar spurring of the quadriceps  tendon  attachment. No radiographically apparent joint effusion.  Medial/lateral/patellofemoral compartment joint space widths are  relatively well preserved.     JOSEPH SPAETH, MD Albert Yeo MD [facility]

## 2020-07-10 NOTE — LETTER
"    7/10/2020         RE: Nina Sommers  [address] [place] [address]        Dear Colleague,    Thank you for referring your patient, Nina Sommers, to the [facility]. Please see a copy of my visit note below.    ASSESSMENT & PLAN  Patient Instructions     1. Primary osteoarthritis of left knee    2. Chronic pain of left knee      -Patient has left knee pain, swelling and stiffness due to aggravation of arthritis  -Patient tolerated aspiration cortisone injection today without complications.  -She will start formal physical therapy and home exercise program  -Patient may continue to work and participate in physical activities as tolerated  -Patient may continue with Advil as needed for pain and swelling  -Patient will follow-up in 4 weeks for reevaluation and progression of activity  -Call direct clinic number [[phone]] at any time with questions or concerns.    Albert Yeo MD [facility] Orthopedics and Sports Medicine  [facility]          -----    SUBJECTIVE  Nina Sommers is a/an 50 year old female who is seen in consultation at the request of  Ramona Ann Aaseby-Aguil* PA-C for evaluation of left knee pain. The patient is seen by themselves.    Onset: 7 month(s) ago. Patient describes injury as kneeling down hard on cement. Will get bruising pain, with pain and stiffness and an occasional \"zing of pain\"  Location of Pain: left anterior knee pain, with some numb feeling over head of fibula  Rating of Pain at worst: 4/10  Rating of Pain Currently: 2/10  Worsened by: kneeling, getting knee bumped, lunging, stepping up or down, twisting. 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 No pertinent past medical history.  Social History     Socioeconomic History     Marital status:      Spouse name: Not on file     Number of children: Not on file     Years of education: Not on file     Highest education level: Not on file   Occupational History     Not on file   Social Needs     Financial resource strain: Not on file     Food insecurity     Worry: Not on file     Inability: Not on file     Transportation needs     Medical: Not on file     Non-medical: Not on file   Tobacco Use     Smoking status: Never Smoker     Smokeless tobacco: Never Used   Substance and Sexual Activity     Alcohol use: Yes     Comment: rare     Drug use: No     Sexual activity: Yes     Partners: Male     Comment:  with vasectomy   Lifestyle     Physical activity     Days per week: Not on file     Minutes per session: Not on file     Stress: Not on file   Relationships     Social connections     Talks on phone: Not on file     Gets together: Not on file     Attends Buddhism service: Not on file     Active member of club or organization: Not on file     Attends meetings of clubs or organizations: Not on file     Relationship status: Not on file     Intimate partner violence     Fear of current or ex partner: Not on file     Emotionally abused: Not on file     Physically abused: Not on file     Forced sexual activity: Not on file   Other Topics Concern     Parent/sibling w/ CABG, MI or angioplasty before 65F 55M? 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    REVIEW OF SYSTEMS:  10 point ROS is negative other than symptoms noted above in HPI, Past Medical History or as stated below  Constitutional: NEGATIVE for fever, chills, change in weight  Skin: NEGATIVE for worrisome  "rashes, moles or lesions  GI/: NEGATIVE for bowel or bladder changes  Neuro: NEGATIVE for weakness, dizziness or paresthesias    OBJECTIVE:  /78   Ht 1.727 m (5' 8\")   Wt 101.2 kg (223 lb)   BMI 33.91 kg/m     General: healthy, alert and in no distress  HEENT: no scleral icterus or conjunctival erythema  Skin: no suspicious lesions or rash. No jaundice.  CV: no pedal edema  Resp: normal respiratory effort without conversational dyspnea   Psych: normal mood and affect  Gait: normal steady gait with appropriate coordination and balance  Neuro: Normal light sensory exam of lower extremity  MSK:  LEFT KNEE  Inspection:    normal alignment  Palpation:    Tender about the medial patellar facet, lateral joint line and medial joint line. Remainder of bony and ligamentous landmarks are nontender.    Mild effusion is present    Patellofemoral crepitus is Present  Range of Motion:     00 extension to 1000 flexion  Strength:    Quadriceps grossly intact and hamstrings grossly intact    Extensor mechanism intact  Special Tests:    Positive: none    Negative: MCL/valgus stress (0 & 30 deg), LCL/varus stress (0 & 30 deg), Lachman's, anterior drawer, posterior drawer, Silvia's    Large Joint Injection/Arthocentesis: L knee joint    Date/Time: 7/10/2020 12:01 PM  Performed by: Yeo, Albert, MD  Authorized by: Yeo, Albert, MD     Indications:  Pain and osteoarthritis  Needle Size:  22 G  Guidance: ultrasound    Approach:  Superolateral  Location:  Knee      Medications:  40 mg methylPREDNISolone 40 MG/ML  Aspirate amount (mL):  12  Aspirate:  Yellow  Outcome:  Tolerated well, no immediate complications  Procedure discussed: discussed risks, benefits, and alternatives    Consent Given by:  Patient  Timeout: timeout called immediately prior to procedure    Prep: patient was prepped and draped in usual sterile fashion            Independent visualization of the below image:  No results found for this or any previous visit " (from the past 24 hour(s)).  KNEE LEFT THREE VIEWS March 9, 2020 8:45 AM      HISTORY: Acute pain of left knee.                                                                      IMPRESSION: Mild patellar spurring of the quadriceps tendon  attachment. No radiographically apparent joint effusion.  Medial/lateral/patellofemoral compartment joint space widths are  relatively well preserved.     JOSEPH SPAETH, MD Albert Yeo MD [facility]      Again, thank you for allowing me to participate in the care of your patient.        Sincerely,        Albert Yeo, MD

## 2020-07-20 ENCOUNTER — THERAPY VISIT (OUTPATIENT)
Dept: PHYSICAL THERAPY | Facility: CLINIC | Age: 50
End: 2020-07-20
Payer: COMMERCIAL

## 2020-07-20 DIAGNOSIS — M17.12 PRIMARY OSTEOARTHRITIS OF LEFT KNEE: ICD-10-CM

## 2020-07-20 DIAGNOSIS — M25.562 LEFT KNEE PAIN: ICD-10-CM

## 2020-07-20 PROCEDURE — 97110 THERAPEUTIC EXERCISES: CPT | Mod: GP | Performed by: PHYSICAL THERAPIST

## 2020-07-20 PROCEDURE — 97161 PT EVAL LOW COMPLEX 20 MIN: CPT | Mod: GP | Performed by: PHYSICAL THERAPIST

## 2020-07-20 ASSESSMENT — ACTIVITIES OF DAILY LIVING (ADL)
KNEE_ACTIVITY_OF_DAILY_LIVING_SUM: 61
LIMPING: I DO NOT HAVE THE SYMPTOM
KNEEL ON THE FRONT OF YOUR KNEE: ACTIVITY IS MINIMALLY DIFFICULT
RISE FROM A CHAIR: ACTIVITY IS MINIMALLY DIFFICULT
STIFFNESS: THE SYMPTOM AFFECTS MY ACTIVITY SLIGHTLY
GO DOWN STAIRS: ACTIVITY IS MINIMALLY DIFFICULT
GIVING WAY, BUCKLING OR SHIFTING OF KNEE: I DO NOT HAVE THE SYMPTOM
AS_A_RESULT_OF_YOUR_KNEE_INJURY,_HOW_WOULD_YOU_RATE_YOUR_CURRENT_LEVEL_OF_DAILY_ACTIVITY?: NORMAL
KNEE_ACTIVITY_OF_DAILY_LIVING_SCORE: 87.14
SIT WITH YOUR KNEE BENT: ACTIVITY IS NOT DIFFICULT
SQUAT: ACTIVITY IS NOT DIFFICULT
STAND: ACTIVITY IS NOT DIFFICULT
PAIN: I HAVE THE SYMPTOM BUT IT DOES NOT AFFECT MY ACTIVITY
RAW_SCORE: 61
HOW_WOULD_YOU_RATE_THE_CURRENT_FUNCTION_OF_YOUR_KNEE_DURING_YOUR_USUAL_DAILY_ACTIVITIES_ON_A_SCALE_FROM_0_TO_100_WITH_100_BEING_YOUR_LEVEL_OF_KNEE_FUNCTION_PRIOR_TO_YOUR_INJURY_AND_0_BEING_THE_INABILITY_TO_PERFORM_ANY_OF_YOUR_USUAL_DAILY_ACTIVITIES?: 90
HOW_WOULD_YOU_RATE_THE_OVERALL_FUNCTION_OF_YOUR_KNEE_DURING_YOUR_USUAL_DAILY_ACTIVITIES?: NEARLY NORMAL
SWELLING: THE SYMPTOM AFFECTS MY ACTIVITY SLIGHTLY
GO UP STAIRS: ACTIVITY IS MINIMALLY DIFFICULT
WEAKNESS: I DO NOT HAVE THE SYMPTOM
WALK: ACTIVITY IS NOT DIFFICULT

## 2020-07-20 NOTE — PROGRESS NOTES
[facility]: Physical Therapy Initial Evaluation   Jul 20, 2020    Subjective:   Chief Complaint: stiffness, some pain in the left knee   Pain: some discomfort in the left knee, around the knee cap   Numbness/Tingling: down the outside of the calf   Weakness: not really, just sometimes kind of feels wobbly   Stiffness: in the left knee   Other: swelling, clicking,   New/Recurrent/Chronic: recurrent  DOI/onset: a few weeks ago   Referral Date: 7/10/2020 - Yeo, Albert, MD   Mechanism of onset: unknown  PMH/surgical history/trauma:    - osteoarthritis (left knee)   - overweight  General health as reported by patient: Good  Medications: None  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 No radiographically apparent joint effusion. Medial/lateral/patellofemoral compartment joint space widths are relatively well preserved.  AM/PM: morning stiffness  Quality of Pain: discomfort, tight  Pain: 1/10 at present,   Worse: sitting, in the morning, tennis, stairs, straightening the knee fully  Better: advil  Progression of Symptoms since onset: better   Sleeping: no disturbance   Current Functional Status:   - sitting - stiff after sitting more than 30 minutes   - tennis - sometimes has more pain/swelling after tennis  - working - has not worked since the injection, hard to say current status  Previous Functional Status: no restrictions  Current HEP/exercise regimen: pelasad,  twice per week  Transportation to Therapy: Independent with transportation  Live with Others: lives with  and two children (all three are college age),     Patient's goal(s): Better movement      Objective:    Standing Posture: WNL. Left knee appears slightly more swollen    Gait: right hip drop, right toe  out    SL Balance: mild impairment bilaterally     Swelling:    R L   Joint Line 38 cm 39 cm         AROM: (* indicates patient's pain)   PROM:(over pressure)   R  L R L   Hyperextension 0 0     Extension 0 0  **   Flexion 130 111*       Strength:    R L   HIP     Flex 5 5   Abd 4+ 4-   Ext 5 5   KNEE     Ext 5 5   Flex 5 5       Special tests:   R L   Sweep Test  2+   Patellar Compression  (+)         Assessment/Plan:    Patient is a 50 year old female with left side knee complaints.    Patient has the following significant findings with corresponding treatment plan.                Referring Diagnosis: Primary osteoarthritis of left knee  Pain -  hot/cold therapy, manual therapy, splint/taping/bracing/orthotics, self management, education and home program  Decreased ROM/flexibility - manual therapy, therapeutic exercise, therapeutic activity and home program  Decreased joint mobility - manual therapy, therapeutic exercise, therapeutic activity and home program  Decreased strength - therapeutic exercise, therapeutic activities and home program  Impaired balance - neuro re-education, therapeutic activities and home program  Edema - cold therapy, cryocuff and self management/home program  Impaired gait - gait training and home program  Decreased function - therapeutic activities and home program        Therapy Evaluation Codes:   1) History comprised of:   Personal factors that impact the plan of care:      Profession.    Comorbidity factors that impact the plan of care are:      Osteoarthritis and Overweight.     Medications impacting care: None.  2) Examination of Body Systems comprised of:   Body structures and functions that impact the plan of care:      Hip and Knee.   Activity limitations that impact the plan of care are:      Sitting, Sports and Working.  3) Clinical presentation characteristics are:   Evolving/Changing.  4) Decision-Making    Low complexity using standardized patient assessment instrument  and/or measureable assessment of functional outcome.  Cumulative Therapy Evaluation is: Low complexity.    Previous and current functional limitations:  (See Goal Flow Sheet for this information)    Short term and Long term goals: (See Goal Flow Sheet for this information)     Communication ability:  Patient appears to be able to clearly communicate and understand verbal and written communication and follow directions correctly.  Treatment Explanation - The following has been discussed with the patient:   RX ordered/plan of care  Anticipated outcomes  Possible risks and side effects  This patient would benefit from PT intervention to resume normal activities.   Rehab potential is good.    Frequency:  1 X week, once daily  Duration:  for 4 weeks tapering to 2 X a month over 4 weeks  Discharge Plan:  Achieve all LTG.  Independent in home treatment program.  Reach maximal therapeutic benefit.    Please refer to the daily flowsheet for treatment today, total treatment time and time spent performing 1:1 timed codes.

## 2020-07-22 PROBLEM — M25.562 LEFT KNEE PAIN: Status: ACTIVE | Noted: 2020-07-22

## 2020-07-22 PROBLEM — M17.12 PRIMARY OSTEOARTHRITIS OF LEFT KNEE: Status: ACTIVE | Noted: 2020-07-22

## 2020-07-22 PROBLEM — M25.462 PAIN AND SWELLING OF LEFT KNEE: Status: ACTIVE | Noted: 2020-07-22

## 2020-08-03 ENCOUNTER — THERAPY VISIT (OUTPATIENT)
Dept: PHYSICAL THERAPY | Facility: CLINIC | Age: 50
End: 2020-08-03
Payer: COMMERCIAL

## 2020-08-03 DIAGNOSIS — M25.462 PAIN AND SWELLING OF LEFT KNEE: ICD-10-CM

## 2020-08-03 DIAGNOSIS — M25.562 PAIN AND SWELLING OF LEFT KNEE: ICD-10-CM

## 2020-08-03 PROCEDURE — 97112 NEUROMUSCULAR REEDUCATION: CPT | Mod: GP | Performed by: PHYSICAL THERAPIST

## 2020-08-03 PROCEDURE — 97140 MANUAL THERAPY 1/> REGIONS: CPT | Mod: GP | Performed by: PHYSICAL THERAPIST

## 2020-08-03 PROCEDURE — 97110 THERAPEUTIC EXERCISES: CPT | Mod: GP | Performed by: PHYSICAL THERAPIST

## 2020-08-17 ENCOUNTER — THERAPY VISIT (OUTPATIENT)
Dept: PHYSICAL THERAPY | Facility: CLINIC | Age: 50
End: 2020-08-17
Payer: COMMERCIAL

## 2020-08-17 DIAGNOSIS — M25.562 PAIN AND SWELLING OF LEFT KNEE: ICD-10-CM

## 2020-08-17 DIAGNOSIS — M25.462 PAIN AND SWELLING OF LEFT KNEE: ICD-10-CM

## 2020-08-17 PROCEDURE — 97110 THERAPEUTIC EXERCISES: CPT | Mod: GP | Performed by: PHYSICAL THERAPIST

## 2020-08-17 PROCEDURE — 97112 NEUROMUSCULAR REEDUCATION: CPT | Mod: GP | Performed by: PHYSICAL THERAPIST

## 2020-09-08 ENCOUNTER — THERAPY VISIT (OUTPATIENT)
Dept: PHYSICAL THERAPY | Facility: CLINIC | Age: 50
End: 2020-09-08
Payer: COMMERCIAL

## 2020-09-08 DIAGNOSIS — M25.562 PAIN AND SWELLING OF LEFT KNEE: ICD-10-CM

## 2020-09-08 DIAGNOSIS — M25.462 PAIN AND SWELLING OF LEFT KNEE: ICD-10-CM

## 2020-09-08 PROCEDURE — 97530 THERAPEUTIC ACTIVITIES: CPT | Mod: GP | Performed by: PHYSICAL THERAPIST

## 2020-09-08 PROCEDURE — 97112 NEUROMUSCULAR REEDUCATION: CPT | Mod: GP | Performed by: PHYSICAL THERAPIST

## 2020-09-08 PROCEDURE — 97110 THERAPEUTIC EXERCISES: CPT | Mod: GP | Performed by: PHYSICAL THERAPIST

## 2020-09-22 ENCOUNTER — THERAPY VISIT (OUTPATIENT)
Dept: PHYSICAL THERAPY | Facility: CLINIC | Age: 50
End: 2020-09-22
Payer: COMMERCIAL

## 2020-09-22 DIAGNOSIS — M25.462 PAIN AND SWELLING OF LEFT KNEE: ICD-10-CM

## 2020-09-22 DIAGNOSIS — M25.562 PAIN AND SWELLING OF LEFT KNEE: ICD-10-CM

## 2020-09-22 PROCEDURE — 97110 THERAPEUTIC EXERCISES: CPT | Mod: GP | Performed by: PHYSICAL THERAPIST

## 2020-09-22 PROCEDURE — 97530 THERAPEUTIC ACTIVITIES: CPT | Mod: GP | Performed by: PHYSICAL THERAPIST

## 2020-09-22 NOTE — PROGRESS NOTES
PROGRESS  REPORT    Progress reporting period is from Jul 20, 2020 to Sep 22, 2020.       SUBJECTIVE  Subjective changes noted by patient:  Starting to feel more normal. Still feels swollen and tender when she kneels on it. Some symptoms with any pressure on the kneecap.   Current pain level is 0/10  .     Initial Pain level: 1/10(more sitffness/swelling/discomfort than pain).   Changes in function:  Yes (See Goal flowsheet attached for changes in current functional level)  Adverse reaction to treatment or activity: activity - still has some pain with kneeling and potentially more swelling with activity.     OBJECTIVE  Changes noted in objective findings:  Yes, patient demonstrates improvements in hip strength and knee flexion and swelling.     Swelling:    R L   Joint Line 39 cm 40 cm         AROM: (* indicates patient's pain)   PROM:(over pressure)   R  L R L   Hyperextension       Extension       Flexion  120       Strength:   R L   HIP     Abd 5 5-       Special tests:   R L   Sweep Test  0+   Patellar Compression  Patient can feel something abnormal, but not painful         ASSESSMENT/PLAN  Updated problem list and treatment plan: Diagnosis 1:  Primary osteoarthritis of left knee  Pain -  hot/cold therapy, manual therapy, splint/taping/bracing/orthotics, self management, education and home program  Decreased ROM/flexibility - manual therapy, therapeutic exercise, therapeutic activity and home program  Decreased joint mobility - manual therapy, therapeutic exercise, therapeutic activity and home program  Decreased strength - therapeutic exercise, therapeutic activities and home program  Impaired balance - neuro re-education, therapeutic activities and home program  Edema - cold therapy, cryocuff and self management/home program  Impaired gait - gait training and home program  Decreased function - therapeutic activities and home program    STG/LTGs have been met or progress has been made towards goals:  Yes (See  Goal flow sheet completed today.)  Assessment of Progress: The patient's condition is improving.  The patient's condition has potential to improve.  Self Management Plans:  Patient has been instructed in a home treatment program.  Patient  has been instructed in self management of symptoms.  I have re-evaluated this patient and find that the nature, scope, duration and intensity of the therapy is appropriate for the medical condition of the patient.  Nina continues to require the following intervention to meet STG and LTG's:  PT    Recommendations:  This patient would benefit from continued therapy.     Frequency:  2 X a month, once daily  Duration:  for 2 months        Please refer to the daily flowsheet for treatment today, total treatment time and time spent performing 1:1 timed codes.

## 2020-10-12 ENCOUNTER — THERAPY VISIT (OUTPATIENT)
Dept: PHYSICAL THERAPY | Facility: CLINIC | Age: 50
End: 2020-10-12
Payer: COMMERCIAL

## 2020-10-12 DIAGNOSIS — M25.462 PAIN AND SWELLING OF LEFT KNEE: ICD-10-CM

## 2020-10-12 DIAGNOSIS — M25.562 PAIN AND SWELLING OF LEFT KNEE: ICD-10-CM

## 2020-10-12 PROCEDURE — 97530 THERAPEUTIC ACTIVITIES: CPT | Mod: GP | Performed by: PHYSICAL THERAPIST

## 2020-10-12 PROCEDURE — 97110 THERAPEUTIC EXERCISES: CPT | Mod: GP | Performed by: PHYSICAL THERAPIST

## 2020-10-12 ASSESSMENT — ACTIVITIES OF DAILY LIVING (ADL)
WEAKNESS: I DO NOT HAVE THE SYMPTOM
GIVING WAY, BUCKLING OR SHIFTING OF KNEE: I DO NOT HAVE THE SYMPTOM
SWELLING: I DO NOT HAVE THE SYMPTOM
KNEEL ON THE FRONT OF YOUR KNEE: ACTIVITY IS MINIMALLY DIFFICULT
HOW_WOULD_YOU_RATE_THE_OVERALL_FUNCTION_OF_YOUR_KNEE_DURING_YOUR_USUAL_DAILY_ACTIVITIES?: NEARLY NORMAL
SIT WITH YOUR KNEE BENT: ACTIVITY IS NOT DIFFICULT
STAND: ACTIVITY IS NOT DIFFICULT
LIMPING: I DO NOT HAVE THE SYMPTOM
RISE FROM A CHAIR: ACTIVITY IS NOT DIFFICULT
GO DOWN STAIRS: ACTIVITY IS NOT DIFFICULT
STIFFNESS: I HAVE THE SYMPTOM BUT IT DOES NOT AFFECT MY ACTIVITY
WALK: ACTIVITY IS NOT DIFFICULT
RAW_SCORE: 67
HOW_WOULD_YOU_RATE_THE_CURRENT_FUNCTION_OF_YOUR_KNEE_DURING_YOUR_USUAL_DAILY_ACTIVITIES_ON_A_SCALE_FROM_0_TO_100_WITH_100_BEING_YOUR_LEVEL_OF_KNEE_FUNCTION_PRIOR_TO_YOUR_INJURY_AND_0_BEING_THE_INABILITY_TO_PERFORM_ANY_OF_YOUR_USUAL_DAILY_ACTIVITIES?: 90
PAIN: I HAVE THE SYMPTOM BUT IT DOES NOT AFFECT MY ACTIVITY
GO UP STAIRS: ACTIVITY IS NOT DIFFICULT
AS_A_RESULT_OF_YOUR_KNEE_INJURY,_HOW_WOULD_YOU_RATE_YOUR_CURRENT_LEVEL_OF_DAILY_ACTIVITY?: NORMAL
SQUAT: ACTIVITY IS NOT DIFFICULT
KNEE_ACTIVITY_OF_DAILY_LIVING_SUM: 67
KNEE_ACTIVITY_OF_DAILY_LIVING_SCORE: 95.71

## 2020-10-12 NOTE — PROGRESS NOTES
PROGRESS  REPORT    Progress reporting period is from Sep 22, 2020 to Oct 12, 2020.       SUBJECTIVE  Subjective changes noted by patient: Hasn't kneelied on it. Swelling was consistent despite activty. Small fluctuation, but no pattern and nothing wild. She was active on it (hiking). No pain really, just achy every once in a while.     Current Pain level: 0/10.     Initial Pain level: 1/10(more sitffness/swelling/discomfort than pain).   Adverse reaction to treatment or activity: None    OBJECTIVE  Changes noted in objective findings:  Yes, patient is able to kneel on the left knee      ASSESSMENT/PLAN  Updated problem list and treatment plan: Diagnosis 1:  Primary osteoarthritis of left knee  Decreased ROM/flexibility - manual therapy, therapeutic exercise, therapeutic activity and home program  Decreased strength - therapeutic exercise, therapeutic activities and home program  Impaired balance - neuro re-education, therapeutic activities and home program  Edema - cold therapy, cryocuff and self management/home program  Decreased function - therapeutic activities and home program    STG/LTGs have been met or progress has been made towards goals:  Yes, no symptoms with current activities  Assessment of Progress: The patient's condition is improving.  The patient's condition has potential to improve.  Self Management Plans:  Patient is independent in a home treatment program.  Patient  has been instructed in self management of symptoms.  I have re-evaluated this patient and find that the nature, scope, duration and intensity of the therapy is appropriate for the medical condition of the patient.  Nina continues to require the following intervention to meet STG and LTG's:  questionable    Recommendations:  Patient will follow-up via phone before Thanksgiving, sooner if no symptoms emerge. If doing well with return to tennis, then discharge from therapy. Presently has no difficulties with activities including workouts,  hiking, stairs, and kneeling, only a minor ache at times.     Please refer to the daily flowsheet for treatment today, total treatment time and time spent performing 1:1 timed codes.

## 2021-02-03 PROBLEM — M25.462 PAIN AND SWELLING OF LEFT KNEE: Status: RESOLVED | Noted: 2020-07-22 | Resolved: 2021-02-03

## 2021-02-03 PROBLEM — M25.562 PAIN AND SWELLING OF LEFT KNEE: Status: RESOLVED | Noted: 2020-07-22 | Resolved: 2021-02-03

## 2021-02-03 NOTE — PROGRESS NOTES
Update as of February 3, 2021: Patient has failed to return to clinic. Current status unknown. This progress note shall serve as a discharge note.

## 2021-03-22 DIAGNOSIS — Z12.31 VISIT FOR SCREENING MAMMOGRAM: ICD-10-CM

## 2021-03-22 PROCEDURE — 77063 BREAST TOMOSYNTHESIS BI: CPT | Mod: TC | Performed by: RADIOLOGY

## 2021-03-22 PROCEDURE — 77067 SCR MAMMO BI INCL CAD: CPT | Mod: TC | Performed by: RADIOLOGY

## 2021-03-30 ENCOUNTER — IMMUNIZATION (OUTPATIENT)
Dept: NURSING | Facility: CLINIC | Age: 51
End: 2021-03-30
Payer: COMMERCIAL

## 2021-03-30 PROCEDURE — 91300 PR COVID VAC PFIZER DIL RECON 30 MCG/0.3 ML IM: CPT

## 2021-03-30 PROCEDURE — 0001A PR COVID VAC PFIZER DIL RECON 30 MCG/0.3 ML IM: CPT

## 2021-04-20 ENCOUNTER — IMMUNIZATION (OUTPATIENT)
Dept: NURSING | Facility: CLINIC | Age: 51
End: 2021-04-20
Attending: INTERNAL MEDICINE
Payer: COMMERCIAL

## 2021-04-20 PROCEDURE — 91300 PR COVID VAC PFIZER DIL RECON 30 MCG/0.3 ML IM: CPT

## 2021-04-20 PROCEDURE — 0002A PR COVID VAC PFIZER DIL RECON 30 MCG/0.3 ML IM: CPT

## 2021-05-29 ENCOUNTER — HEALTH MAINTENANCE LETTER (OUTPATIENT)
Age: 51
End: 2021-05-29

## 2022-01-21 ENCOUNTER — IMMUNIZATION (OUTPATIENT)
Dept: NURSING | Facility: CLINIC | Age: 52
End: 2022-01-21
Payer: COMMERCIAL

## 2022-01-21 PROCEDURE — 91305 COVID-19,PF,PFIZER (12+ YRS): CPT

## 2022-01-21 PROCEDURE — 0054A COVID-19,PF,PFIZER (12+ YRS): CPT

## 2022-04-04 ENCOUNTER — ANCILLARY PROCEDURE (OUTPATIENT)
Dept: MAMMOGRAPHY | Facility: CLINIC | Age: 52
End: 2022-04-04
Attending: PHYSICIAN ASSISTANT
Payer: COMMERCIAL

## 2022-04-04 DIAGNOSIS — Z12.31 VISIT FOR SCREENING MAMMOGRAM: ICD-10-CM

## 2022-04-04 PROCEDURE — 77067 SCR MAMMO BI INCL CAD: CPT | Mod: TC | Performed by: RADIOLOGY

## 2022-04-04 PROCEDURE — 77063 BREAST TOMOSYNTHESIS BI: CPT | Mod: TC | Performed by: RADIOLOGY

## 2022-06-25 ENCOUNTER — HEALTH MAINTENANCE LETTER (OUTPATIENT)
Age: 52
End: 2022-06-25

## 2022-09-16 ENCOUNTER — OFFICE VISIT (OUTPATIENT)
Dept: FAMILY MEDICINE | Facility: CLINIC | Age: 52
End: 2022-09-16
Payer: COMMERCIAL

## 2022-09-16 VITALS
BODY MASS INDEX: 37.02 KG/M2 | DIASTOLIC BLOOD PRESSURE: 86 MMHG | TEMPERATURE: 98.8 F | WEIGHT: 244.3 LBS | SYSTOLIC BLOOD PRESSURE: 136 MMHG | HEIGHT: 68 IN | RESPIRATION RATE: 18 BRPM | OXYGEN SATURATION: 98 % | HEART RATE: 93 BPM

## 2022-09-16 DIAGNOSIS — N95.1 MENOPAUSAL SYNDROME (HOT FLASHES): Primary | ICD-10-CM

## 2022-09-16 DIAGNOSIS — Z13.1 SCREENING FOR DIABETES MELLITUS: ICD-10-CM

## 2022-09-16 DIAGNOSIS — Z12.11 SCREEN FOR COLON CANCER: ICD-10-CM

## 2022-09-16 DIAGNOSIS — E66.01 MORBID OBESITY (H): ICD-10-CM

## 2022-09-16 DIAGNOSIS — Z11.59 NEED FOR HEPATITIS C SCREENING TEST: ICD-10-CM

## 2022-09-16 LAB — HBA1C MFR BLD: 5.5 % (ref 0–5.6)

## 2022-09-16 PROCEDURE — 99214 OFFICE O/P EST MOD 30 MIN: CPT | Performed by: PHYSICIAN ASSISTANT

## 2022-09-16 PROCEDURE — 36415 COLL VENOUS BLD VENIPUNCTURE: CPT | Performed by: PHYSICIAN ASSISTANT

## 2022-09-16 PROCEDURE — 86803 HEPATITIS C AB TEST: CPT | Performed by: PHYSICIAN ASSISTANT

## 2022-09-16 PROCEDURE — 83036 HEMOGLOBIN GLYCOSYLATED A1C: CPT | Performed by: PHYSICIAN ASSISTANT

## 2022-09-16 PROCEDURE — 84443 ASSAY THYROID STIM HORMONE: CPT | Performed by: PHYSICIAN ASSISTANT

## 2022-09-16 RX ORDER — FLUOXETINE 10 MG/1
10 CAPSULE ORAL DAILY
Qty: 90 CAPSULE | Refills: 1 | Status: SHIPPED | OUTPATIENT
Start: 2022-09-16 | End: 2024-03-15

## 2022-09-16 SDOH — ECONOMIC STABILITY: INCOME INSECURITY: HOW HARD IS IT FOR YOU TO PAY FOR THE VERY BASICS LIKE FOOD, HOUSING, MEDICAL CARE, AND HEATING?: NOT HARD AT ALL

## 2022-09-16 SDOH — ECONOMIC STABILITY: TRANSPORTATION INSECURITY
IN THE PAST 12 MONTHS, HAS LACK OF TRANSPORTATION KEPT YOU FROM MEETINGS, WORK, OR FROM GETTING THINGS NEEDED FOR DAILY LIVING?: NO

## 2022-09-16 SDOH — ECONOMIC STABILITY: TRANSPORTATION INSECURITY
IN THE PAST 12 MONTHS, HAS THE LACK OF TRANSPORTATION KEPT YOU FROM MEDICAL APPOINTMENTS OR FROM GETTING MEDICATIONS?: NO

## 2022-09-16 SDOH — ECONOMIC STABILITY: FOOD INSECURITY: WITHIN THE PAST 12 MONTHS, YOU WORRIED THAT YOUR FOOD WOULD RUN OUT BEFORE YOU GOT MONEY TO BUY MORE.: NEVER TRUE

## 2022-09-16 SDOH — HEALTH STABILITY: PHYSICAL HEALTH: ON AVERAGE, HOW MANY MINUTES DO YOU ENGAGE IN EXERCISE AT THIS LEVEL?: 30 MIN

## 2022-09-16 SDOH — ECONOMIC STABILITY: INCOME INSECURITY: IN THE LAST 12 MONTHS, WAS THERE A TIME WHEN YOU WERE NOT ABLE TO PAY THE MORTGAGE OR RENT ON TIME?: NO

## 2022-09-16 SDOH — HEALTH STABILITY: PHYSICAL HEALTH: ON AVERAGE, HOW MANY DAYS PER WEEK DO YOU ENGAGE IN MODERATE TO STRENUOUS EXERCISE (LIKE A BRISK WALK)?: 4 DAYS

## 2022-09-16 SDOH — ECONOMIC STABILITY: FOOD INSECURITY: WITHIN THE PAST 12 MONTHS, THE FOOD YOU BOUGHT JUST DIDN'T LAST AND YOU DIDN'T HAVE MONEY TO GET MORE.: NEVER TRUE

## 2022-09-16 ASSESSMENT — ENCOUNTER SYMPTOMS
FEVER: 0
DYSURIA: 0
EYE PAIN: 0
HEMATURIA: 0
CHILLS: 0
HEADACHES: 0
NERVOUS/ANXIOUS: 0
DIARRHEA: 0
COUGH: 0
CONSTIPATION: 0
DIZZINESS: 0
PALPITATIONS: 0
NAUSEA: 0
JOINT SWELLING: 0
HEARTBURN: 0
ABDOMINAL PAIN: 0
PARESTHESIAS: 0
SHORTNESS OF BREATH: 0
WEAKNESS: 0
FREQUENCY: 0
SORE THROAT: 0
MYALGIAS: 0
HEMATOCHEZIA: 0
ARTHRALGIAS: 0

## 2022-09-16 ASSESSMENT — LIFESTYLE VARIABLES
HOW OFTEN DO YOU HAVE A DRINK CONTAINING ALCOHOL: 2-4 TIMES A MONTH
AUDIT-C TOTAL SCORE: 3
HOW OFTEN DO YOU HAVE SIX OR MORE DRINKS ON ONE OCCASION: LESS THAN MONTHLY
SKIP TO QUESTIONS 9-10: 0
HOW MANY STANDARD DRINKS CONTAINING ALCOHOL DO YOU HAVE ON A TYPICAL DAY: 1 OR 2

## 2022-09-16 ASSESSMENT — SOCIAL DETERMINANTS OF HEALTH (SDOH)
IN A TYPICAL WEEK, HOW MANY TIMES DO YOU TALK ON THE PHONE WITH FAMILY, FRIENDS, OR NEIGHBORS?: MORE THAN THREE TIMES A WEEK
HOW OFTEN DO YOU ATTEND CHURCH OR RELIGIOUS SERVICES?: PATIENT DECLINED
DO YOU BELONG TO ANY CLUBS OR ORGANIZATIONS SUCH AS CHURCH GROUPS UNIONS, FRATERNAL OR ATHLETIC GROUPS, OR SCHOOL GROUPS?: NO
HOW OFTEN DO YOU GET TOGETHER WITH FRIENDS OR RELATIVES?: TWICE A WEEK

## 2022-09-17 LAB — TSH SERPL DL<=0.005 MIU/L-ACNC: 1.6 MU/L (ref 0.4–4)

## 2022-09-19 LAB — HCV AB SERPL QL IA: NONREACTIVE

## 2022-09-27 ENCOUNTER — TELEPHONE (OUTPATIENT)
Dept: GASTROENTEROLOGY | Facility: CLINIC | Age: 52
End: 2022-09-27

## 2022-09-27 NOTE — TELEPHONE ENCOUNTER
Screening Questions  BLUE  KIND OF PREP RED  LOCATION [review exclusion criteria] GREEN  SEDATION TYPE        YES Are you active on mychart?        Ordering/Referring Provider?        [facility] What type of coverage do you have?      NO Have you had a positive covid test in the last 90 days?     1. 34.7 BMI  [BMI 40+ - review exclusion criteria]    2. YES  Are you able to give consent for your medical care? [IF NO,RN REVIEW]        3. NO  Are you taking any prescription pain medications on a routine schedule?        3a.  EXTENDED PREP What kind of prescription?   4. NO Do you have any chemical dependencies such as alcohol, street drugs, or methadone?    5. NO Do you have any history of post-traumatic stress syndrome, severe anxiety or history of psychosis?      **If yes 3- 5 , please schedule with MAC sedation.**          IF YES TO ANY 6 - 10 - HOSPITAL SETTING ONLY.     6.   NO Do you need assistance transferring?     7.   NO Have you had a heart or lung transplant?    8.   NO Are you currently on dialysis?   9.   NO Do you use daily home oxygen?   10. NO Do you take nitroglycerin?   10a.  If yes, how often?     11. [FEMALES]  NO Are you currently pregnant?    11a.  If yes, how many weeks? [ Greater than 12 weeks, OR NEEDED]    12. NO Do you have Pulmonary Hypertension? *NEED PAC APPT AT [facility]*     13. NO [review exclusion criteria]  Do you have any implantable devices in your body (pacemaker, defib, LVAD)?    14. NO In the past 6 months, have you had any heart related issues including cardiomyopathy or heart attack?     14a.  If yes, did it require cardiac stenting if so when?     15. NO Have you had a stroke or Transient ischemic attack (TIA - aka  mini stroke ) within 6 months?      16. NO Do you have mod to severe Obstructive Sleep Apnea?  [Hospital only - Ok at [place]]    17. NO Do you have SEVERE AND UNCONTROLLED asthma? *NEED PAC APPT AT [facility]*     18. NO Are you currently taking any blood thinners?     " 18a. If yes, inform patient to \"follow up w/ ordering provider for bridging instructions.\"    19. NO Do you take the medication Phentermine?    19a. If yes, \"Hold for 7 days before procedure.  Please consult your prescribing provider if you have questions about holding this medication.\"     20. NO  Do you have chronic kidney disease?      21. NO  Do you have a diagnosis of diabetes?     22. NO  On a regular basis do you go 3-5 days between bowel movements?     23.  Preferred LOCAL Pharmacy for Pre Prescription    [ LIST ONLY ONE PHARMACY]     [id number] - [place], [place] - [address] [address]        - CLOSING REMINDERS -    Informed patient they will need an adult    Cannot take any type of public or medical transportation alone    Conscious Sedation- Needs  for 6 hours after the procedure       MAC/General-Needs  for 24 hours after procedure    Pre-Procedure Covid test to be completed [[facility] PCR Testing Required]    Confirmed Nurse will call to complete assessment       - SCHEDULING DETAILS -     CAROL  Surgeon    11/21/2022  Date of Procedure  Lower Endoscopy [Colonoscopy]  Type of Procedure Scheduled   Levine, Susan. \Hospital Has a New Name and Outlook.\"" PREP-If you answer yes to questions #8, #20, #21Which Colonoscopy Prep was Sent?     CS Sedation Type     NO PAC / Pre-op Required         Additional comments:            "

## 2022-11-04 RX ORDER — BISACODYL 5 MG
TABLET, DELAYED RELEASE (ENTERIC COATED) ORAL
Qty: 4 TABLET | Refills: 0 | Status: SHIPPED | OUTPATIENT
Start: 2022-11-04 | End: 2022-11-21

## 2022-11-21 ENCOUNTER — APPOINTMENT (OUTPATIENT)
Dept: SURGERY | Facility: PHYSICIAN GROUP | Age: 52
End: 2022-11-21
Payer: COMMERCIAL

## 2022-11-21 ENCOUNTER — HOSPITAL ENCOUNTER (OUTPATIENT)
Facility: CLINIC | Age: 52
Discharge: HOME | End: 2022-11-21
Attending: SURGERY | Admitting: SURGERY
Payer: COMMERCIAL

## 2022-11-21 VITALS
SYSTOLIC BLOOD PRESSURE: 116 MMHG | TEMPERATURE: 97.2 F | OXYGEN SATURATION: 99 % | BODY MASS INDEX: 36.98 KG/M2 | WEIGHT: 244 LBS | DIASTOLIC BLOOD PRESSURE: 85 MMHG | HEIGHT: 68 IN | HEART RATE: 54 BPM | RESPIRATION RATE: 15 BRPM

## 2022-11-21 DIAGNOSIS — C18.9 COLON CANCER (H): Primary | ICD-10-CM

## 2022-11-21 LAB — COLONOSCOPY: NORMAL

## 2022-11-21 PROCEDURE — 45380 COLONOSCOPY AND BIOPSY: CPT | Performed by: SURGERY

## 2022-11-21 PROCEDURE — 88305 TISSUE EXAM BY PATHOLOGIST: CPT | Mod: TC | Performed by: SURGERY

## 2022-11-21 PROCEDURE — 45385 COLONOSCOPY W/LESION REMOVAL: CPT | Mod: PT | Performed by: SURGERY

## 2022-11-21 PROCEDURE — 99152 MOD SED SAME PHYS/QHP 5/>YRS: CPT | Mod: PT | Performed by: SURGERY

## 2022-11-21 PROCEDURE — 45385 COLONOSCOPY W/LESION REMOVAL: CPT | Mod: PT

## 2022-11-21 PROCEDURE — 250N000011 HC RX IP 250 OP 636: Performed by: SURGERY

## 2022-11-21 PROCEDURE — G0500 MOD SEDAT ENDO SERVICE >5YRS: HCPCS | Performed by: SURGERY

## 2022-11-21 PROCEDURE — 45380 COLONOSCOPY AND BIOPSY: CPT | Mod: PT | Performed by: SURGERY

## 2022-11-21 RX ORDER — NALOXONE HYDROCHLORIDE 0.4 MG/ML
0.4 INJECTION, SOLUTION INTRAMUSCULAR; INTRAVENOUS; SUBCUTANEOUS
Status: DISCONTINUED | OUTPATIENT
Start: 2022-11-21 | End: 2022-11-21 | Stop reason: HOSPADM

## 2022-11-21 RX ORDER — FLUMAZENIL 0.1 MG/ML
0.2 INJECTION, SOLUTION INTRAVENOUS
Status: DISCONTINUED | OUTPATIENT
Start: 2022-11-21 | End: 2022-11-21 | Stop reason: HOSPADM

## 2022-11-21 RX ORDER — NALOXONE HYDROCHLORIDE 0.4 MG/ML
0.2 INJECTION, SOLUTION INTRAMUSCULAR; INTRAVENOUS; SUBCUTANEOUS
Status: DISCONTINUED | OUTPATIENT
Start: 2022-11-21 | End: 2022-11-21 | Stop reason: HOSPADM

## 2022-11-21 RX ORDER — ONDANSETRON 2 MG/ML
4 INJECTION INTRAMUSCULAR; INTRAVENOUS EVERY 6 HOURS PRN
Status: DISCONTINUED | OUTPATIENT
Start: 2022-11-21 | End: 2022-11-21 | Stop reason: HOSPADM

## 2022-11-21 RX ORDER — LIDOCAINE 40 MG/G
CREAM TOPICAL
Status: DISCONTINUED | OUTPATIENT
Start: 2022-11-21 | End: 2022-11-21 | Stop reason: HOSPADM

## 2022-11-21 RX ORDER — ATROPINE SULFATE 0.1 MG/ML
1 INJECTION INTRAVENOUS
Status: DISCONTINUED | OUTPATIENT
Start: 2022-11-21 | End: 2022-11-21 | Stop reason: HOSPADM

## 2022-11-21 RX ORDER — EPINEPHRINE 1 MG/ML
0.1 INJECTION, SOLUTION INTRAMUSCULAR; SUBCUTANEOUS
Status: DISCONTINUED | OUTPATIENT
Start: 2022-11-21 | End: 2022-11-21 | Stop reason: HOSPADM

## 2022-11-21 RX ORDER — SIMETHICONE 40MG/0.6ML
133 SUSPENSION, DROPS(FINAL DOSAGE FORM)(ML) ORAL
Status: DISCONTINUED | OUTPATIENT
Start: 2022-11-21 | End: 2022-11-21 | Stop reason: HOSPADM

## 2022-11-21 RX ORDER — FENTANYL CITRATE 0.05 MG/ML
50-100 INJECTION, SOLUTION INTRAMUSCULAR; INTRAVENOUS EVERY 5 MIN PRN
Status: DISCONTINUED | OUTPATIENT
Start: 2022-11-21 | End: 2022-11-21 | Stop reason: HOSPADM

## 2022-11-21 RX ORDER — ONDANSETRON 4 MG/1
4 TABLET, ORALLY DISINTEGRATING ORAL EVERY 6 HOURS PRN
Status: DISCONTINUED | OUTPATIENT
Start: 2022-11-21 | End: 2022-11-21 | Stop reason: HOSPADM

## 2022-11-21 RX ORDER — PROCHLORPERAZINE MALEATE 10 MG
10 TABLET ORAL EVERY 6 HOURS PRN
Status: DISCONTINUED | OUTPATIENT
Start: 2022-11-21 | End: 2022-11-21 | Stop reason: HOSPADM

## 2022-11-21 RX ORDER — ONDANSETRON 2 MG/ML
4 INJECTION INTRAMUSCULAR; INTRAVENOUS
Status: DISCONTINUED | OUTPATIENT
Start: 2022-11-21 | End: 2022-11-21 | Stop reason: HOSPADM

## 2022-11-21 RX ORDER — DIPHENHYDRAMINE HYDROCHLORIDE 50 MG/ML
25-50 INJECTION INTRAMUSCULAR; INTRAVENOUS
Status: DISCONTINUED | OUTPATIENT
Start: 2022-11-21 | End: 2022-11-21 | Stop reason: HOSPADM

## 2022-11-21 RX ADMIN — FENTANYL CITRATE 100 MCG: 0.05 INJECTION, SOLUTION INTRAMUSCULAR; INTRAVENOUS at 10:01

## 2022-11-21 RX ADMIN — MIDAZOLAM HYDROCHLORIDE 2 MG: 1 INJECTION, SOLUTION INTRAMUSCULAR; INTRAVENOUS at 10:01

## 2022-11-21 ASSESSMENT — ACTIVITIES OF DAILY LIVING (ADL): ADLS_ACUITY_SCORE: 35

## 2022-11-21 NOTE — DISCHARGE INSTRUCTIONS

## 2022-11-21 NOTE — H&P
[facility] Anesthesia Pre-op History and Physical    Nina Sommers MRN# [id number]   Age: 52 year old YOB: 1970      Date of Surgery: 11/21/22   [facility]      Date of Exam 11/21/2022 Facility (Same day)       Primary care provider: Aaseby-Aguilera, Ramona Ann         Chief Complaint and/or Reason for Procedure:   screening colonoscopy         Active problem list:     Patient Active Problem List    Diagnosis Date Noted     Morbid obesity (H) 09/16/2022     Priority: Medium     Primary osteoarthritis of left knee 07/22/2020     Priority: Medium     Family history of coronary artery disease 11/12/2012     Priority: Medium     Hyperlipidemia LDL goal <160 11/12/2012     Priority: Medium     Obesity 11/12/2012     Priority: Medium     Hx of abnormal cervical Pap smear 04/01/2000     Priority: Medium     approx 2000 - Had one abnormal but didn't need anything other than repeat pap and was normal then .  4/2/20 NIL Pap, Neg HPV. Plan: cotest in 5 years.              Medications (include herbals and vitamins):     Current Outpatient Medications   Medication Instructions     bisacodyl (DULCOLAX) 5 MG EC tablet Take 2 tablets at 3 pm the day before your procedure. If your procedure is before 11 am, take 2 additional tablets at 11 pm. If your procedure is after 11 am, take 2 additional tablets at 6 am. For additional instructions refer to your colonoscopy prep instructions.     FLUoxetine (PROZAC) 10 mg, Oral, DAILY     polyethylene glycol (GOLYTELY) 236 g suspension The night before the exam at 6 pm drink an 8-ounce glass every 15 minutes until the jug is half empty. If you arrive before 11 AM: Drink the other half of the Golytely jug at 11 PM night before procedure. If you arrive after 11 AM: Drink the other half of the Golytely jug at 6 AM day of procedure. For additional instructions refer to your colonoscopy prep instructions.                Allergies:    No Known  "Allergies            Physical Exam:   All vitals have been reviewed  Patient Vitals for the past 8 hrs:   Height Weight   11/21/22 0935 1.727 m (5' 8\") 110.7 kg (244 lb)     Airway assessment:   Mallampatti classification: Class III (visualization of the soft palate and base of uvula)}     Lungs:   No increased work of breathing, good air exchange, clear to auscultation bilaterally     Cardiovascular:   regular rate and rhythm             Lab / Radiology Results:   COVID-19 negative        Anesthetic risk and/or ASA classification:   asa2    Jennifer Atkinson MD       "

## 2022-11-22 LAB
PATH REPORT.COMMENTS IMP SPEC: NORMAL
PATH REPORT.COMMENTS IMP SPEC: NORMAL
PATH REPORT.FINAL DX SPEC: NORMAL
PATH REPORT.GROSS SPEC: NORMAL
PATH REPORT.MICROSCOPIC SPEC OTHER STN: NORMAL
PATH REPORT.RELEVANT HX SPEC: NORMAL
PHOTO IMAGE: NORMAL

## 2022-11-22 PROCEDURE — 88305 TISSUE EXAM BY PATHOLOGIST: CPT | Mod: 26 | Performed by: PATHOLOGY

## 2023-04-17 ENCOUNTER — ANCILLARY PROCEDURE (OUTPATIENT)
Dept: MAMMOGRAPHY | Facility: CLINIC | Age: 53
End: 2023-04-17
Attending: PHYSICIAN ASSISTANT
Payer: COMMERCIAL

## 2023-04-17 DIAGNOSIS — Z12.31 VISIT FOR SCREENING MAMMOGRAM: ICD-10-CM

## 2023-04-17 PROCEDURE — 77063 BREAST TOMOSYNTHESIS BI: CPT | Mod: TC | Performed by: RADIOLOGY

## 2023-04-17 PROCEDURE — 77067 SCR MAMMO BI INCL CAD: CPT | Mod: TC | Performed by: RADIOLOGY

## 2023-07-02 ENCOUNTER — HEALTH MAINTENANCE LETTER (OUTPATIENT)
Age: 53
End: 2023-07-02

## 2024-03-08 SDOH — HEALTH STABILITY: PHYSICAL HEALTH: ON AVERAGE, HOW MANY MINUTES DO YOU ENGAGE IN EXERCISE AT THIS LEVEL?: 40 MIN

## 2024-03-08 SDOH — HEALTH STABILITY: PHYSICAL HEALTH: ON AVERAGE, HOW MANY DAYS PER WEEK DO YOU ENGAGE IN MODERATE TO STRENUOUS EXERCISE (LIKE A BRISK WALK)?: 3 DAYS

## 2024-03-08 ASSESSMENT — LIFESTYLE VARIABLES
HOW OFTEN DO YOU HAVE SIX OR MORE DRINKS ON ONE OCCASION: LESS THAN MONTHLY
SKIP TO QUESTIONS 9-10: 0
HOW OFTEN DO YOU HAVE A DRINK CONTAINING ALCOHOL: 2-4 TIMES A MONTH
HOW MANY STANDARD DRINKS CONTAINING ALCOHOL DO YOU HAVE ON A TYPICAL DAY: 1 OR 2
AUDIT-C TOTAL SCORE: 3

## 2024-03-08 ASSESSMENT — SOCIAL DETERMINANTS OF HEALTH (SDOH)
HOW OFTEN DO YOU GET TOGETHER WITH FRIENDS OR RELATIVES?: TWICE A WEEK
HOW OFTEN DO YOU GET TOGETHER WITH FRIENDS OR RELATIVES?: TWICE A WEEK
IN A TYPICAL WEEK, HOW MANY TIMES DO YOU TALK ON THE PHONE WITH FAMILY, FRIENDS, OR NEIGHBORS?: THREE TIMES A WEEK
HOW OFTEN DO YOU ATTENT MEETINGS OF THE CLUB OR ORGANIZATION YOU BELONG TO?: 1 TO 4 TIMES PER YEAR
HOW OFTEN DO YOU ATTEND CHURCH OR RELIGIOUS SERVICES?: 1 TO 4 TIMES PER YEAR
DO YOU BELONG TO ANY CLUBS OR ORGANIZATIONS SUCH AS CHURCH GROUPS UNIONS, FRATERNAL OR ATHLETIC GROUPS, OR SCHOOL GROUPS?: YES

## 2024-03-15 ENCOUNTER — OFFICE VISIT (OUTPATIENT)
Dept: FAMILY MEDICINE | Facility: CLINIC | Age: 54
End: 2024-03-15
Payer: COMMERCIAL

## 2024-03-15 VITALS
OXYGEN SATURATION: 98 % | BODY MASS INDEX: 37.18 KG/M2 | DIASTOLIC BLOOD PRESSURE: 57 MMHG | SYSTOLIC BLOOD PRESSURE: 119 MMHG | HEIGHT: 69 IN | RESPIRATION RATE: 16 BRPM | HEART RATE: 83 BPM | WEIGHT: 251 LBS | TEMPERATURE: 97.9 F

## 2024-03-15 DIAGNOSIS — Z00.00 ROUTINE GENERAL MEDICAL EXAMINATION AT A HEALTH CARE FACILITY: Primary | ICD-10-CM

## 2024-03-15 DIAGNOSIS — Z13.1 SCREENING FOR DIABETES MELLITUS: ICD-10-CM

## 2024-03-15 DIAGNOSIS — T75.3XXS MOTION SICKNESS, SEQUELA: ICD-10-CM

## 2024-03-15 DIAGNOSIS — Z12.31 VISIT FOR SCREENING MAMMOGRAM: ICD-10-CM

## 2024-03-15 DIAGNOSIS — Z13.0 SCREENING FOR BLOOD DISEASE: ICD-10-CM

## 2024-03-15 DIAGNOSIS — Z13.220 SCREENING, LIPID: ICD-10-CM

## 2024-03-15 PROCEDURE — 99396 PREV VISIT EST AGE 40-64: CPT | Performed by: PHYSICIAN ASSISTANT

## 2024-03-15 RX ORDER — ESTRADIOL 0.04 MG/D
1 PATCH TRANSDERMAL WEEKLY
COMMUNITY
Start: 2024-01-22

## 2024-03-15 RX ORDER — SCOLOPAMINE TRANSDERMAL SYSTEM 1 MG/1
1 PATCH, EXTENDED RELEASE TRANSDERMAL
Qty: 10 PATCH | Refills: 0 | Status: SHIPPED | OUTPATIENT
Start: 2024-03-15 | End: 2024-07-26

## 2024-03-15 NOTE — PATIENT INSTRUCTIONS
Preventive Care Advice   This is general advice given by our system to help you stay healthy. However, your care team may have specific advice just for you. Please talk to your care team about your preventive care needs.  Nutrition  Eat 5 or more servings of fruits and vegetables each day.  Try wheat bread, brown rice and whole grain pasta (instead of white bread, rice, and pasta).  Get enough calcium and vitamin D. Check the label on foods and aim for 100% of the RDA (recommended daily allowance).  Lifestyle  Exercise at least 150 minutes each week   (30 minutes a day, 5 days a week).  Do muscle strengthening activities 2 days a week. These help control your weight and prevent disease.  No smoking.  Wear sunscreen to prevent skin cancer.  Have a dental exam and cleaning every 6 months.  Yearly exams  See your health care team every year to talk about:  Any changes in your health.  Any medicines your care team has prescribed.  Preventive care, family planning, and ways to prevent chronic diseases.  Shots (vaccines)   HPV shots (up to age 26), if you've never had them before.  Hepatitis B shots (up to age 59), if you've never had them before.  COVID-19 shot: Get this shot when it's due.  Flu shot: Get a flu shot every year.  Tetanus shot: Get a tetanus shot every 10 years.  Pneumococcal, hepatitis A, and RSV shots: Ask your care team if you need these based on your risk.  Shingles shot (for age 50 and up).  General health tests  Diabetes screening:  Starting at age 35, Get screened for diabetes at least every 3 years.  If you are younger than age 35, ask your care team if you should be screened for diabetes.  Cholesterol test: At age 39, start having a cholesterol test every 5 years, or more often if advised.  Bone density scan (DEXA): At age 50, ask your care team if you should have this scan for osteoporosis (brittle bones).  Hepatitis C: Get tested at least once in your life.  STIs (sexually transmitted  infections)  Before age 24: Ask your care team if you should be screened for STIs.  After age 24: Get screened for STIs if you're at risk. You are at risk for STIs (including HIV) if:  You are sexually active with more than one person.  You don't use condoms every time.  You or a partner was diagnosed with a sexually transmitted infection.  If you are at risk for HIV, ask about PrEP medicine to prevent HIV.  Get tested for HIV at least once in your life, whether you are at risk for HIV or not.  Cancer screening tests  Cervical cancer screening: If you have a cervix, begin getting regular cervical cancer screening tests at age 21. Most people who have regular screenings with normal results can stop after age 65. Talk about this with your provider.  Breast cancer scan (mammogram): If you've ever had breasts, begin having regular mammograms starting at age 40. This is a scan to check for breast cancer.  Colon cancer screening: It is important to start screening for colon cancer at age 45.  Have a colonoscopy test every 10 years (or more often if you're at risk) Or, ask your provider about stool tests like a FIT test every year or Cologuard test every 3 years.  To learn more about your testing options, visit: https://www.[employer]/278085.pdf.  For help making a decision, visit: https://bit.ly/ff64131.  Prostate cancer screening test: If you have a prostate and are age 55 to 69, ask your provider if you would benefit from a yearly prostate cancer screening test.  Lung cancer screening: If you are a current or former smoker age 50 to 80, ask your care team if ongoing lung cancer screenings are right for you.  For informational purposes only. Not to replace the advice of your health care provider. Copyright   2023 [employer]. All rights reserved. Clinically reviewed by the [facility] Transitions Program. e-Zassi [id number] - REV 01/24.

## 2024-03-15 NOTE — PROGRESS NOTES
"Preventive Care Visit  [facility] [place]  Pham Ann Aaseby-Aguilera, PA-C, Family Medicine  Mar 15, 2024      Assessment & Plan     Routine general medical examination at a health care facility  Age and gender appropriate preventive care and screenings are discussed.  Particular attention to personal preventive care and age appropriate lifestyle including the incorporation of healthy diet and physical activity is made       Screening for diabetes mellitus    - Comprehensive metabolic panel; Future    Screening, lipid    - Lipid Profile; Future    Screening for blood disease    - CBC with platelets; Future    Motion sickness, sequela  Traveling with boat trip   - scopolamine (TRANSDERM) 1 MG/3DAYS 72 hr patch; Place 1 patch onto the skin every 72 hours    Visit for screening mammogram    - MA Screen Bilateral w/Ed; Future              BMI  Estimated body mass index is 37.07 kg/m  as calculated from the following:    Height as of this encounter: 1.753 m (5' 9\").    Weight as of this encounter: 113.9 kg (251 lb).       Counseling  Appropriate preventive services were discussed with this patient, including applicable screening as appropriate for fall prevention, nutrition, physical activity, Tobacco-use cessation, weight loss and cognition.  Checklist reviewing preventive services available has been given to the patient.  Reviewed patient's diet, addressing concerns and/or questions.   She is at risk for lack of exercise and has been provided with information to increase physical activity for the benefit of her well-being.           Serenity Wood is a 53 year old, presenting for the following:  Physical (PE--pap UTD) and Medication Request (Requesting medication for motion sickness---traveling )        3/15/2024     1:39 PM   Additional Questions   Roomed by Jennifer Whitney        Health Care Directive  Patient does not have a Health Care Directive or Living Will: Discussed advance care planning " with patient; however, patient declined at this time.    HPI            3/8/2024   General Health   How would you rate your overall physical health? Good   Feel stress (tense, anxious, or unable to sleep) Not at all    Not at all         3/8/2024   Nutrition   Three or more servings of calcium each day? Yes   Diet: Other   If other, please elaborate: Macro counting protein prioritized   How many servings of fruit and vegetables per day? (!) 2-3   How many sweetened beverages each day? 0-1         3/8/2024   Exercise   Days per week of moderate/strenous exercise 3 days    3 days   Average minutes spent exercising at this level 40 min    40 min         3/8/2024   Social Factors   Frequency of gathering with friends or relatives Twice a week    Twice a week   Worry food won't last until get money to buy more No    No   Food not last or not have enough money for food? No    No   Do you have housing?  Yes    Yes   Are you worried about losing your housing? No    No   Lack of transportation? No    No   Unable to get utilities (heat,electricity)? No    No         3/8/2024   Fall Risk   Fallen 2 or more times in the past year? No   Trouble with walking or balance? No          3/8/2024   Dental   Dentist two times every year? Yes         3/8/2024   TB Screening   Were you born outside of [place]?  No         Today's PHQ-2 Score:       3/15/2024     1:35 PM   PHQ-2 ( 1999 [employer])   Q1: Little interest or pleasure in doing things 0   Q2: Feeling down, depressed or hopeless 0   PHQ-2 Score 0   Q1: Little interest or pleasure in doing things Not at all   Q2: Feeling down, depressed or hopeless Not at all   PHQ-2 Score 0           3/8/2024   Substance Use   Frequency of drinking alcohol? 2-4 times a month   Alcohol more than 3/day or more than 7/wk No   Do you use any other substances recreationally? No     Social History     Tobacco Use    Smoking status: Never     Passive exposure: Never    Smokeless tobacco: Never   Vaping Use     Vaping Use: Never used   Substance Use Topics    Alcohol use: Yes     Comment: rare    Drug use: No             3/8/2024   Breast Cancer Screening   Family history of breast, colon, or ovarian cancer? No / Unknown         4/17/2023   LAST FHS-7 RESULTS   1st degree relative breast or ovarian cancer No   Any relative bilateral breast cancer No   Any male have breast cancer No   Any ONE woman have BOTH breast AND ovarian cancer No   Any woman with breast cancer before 50yrs No   2 or more relatives with breast AND/OR ovarian cancer No   2 or more relatives with breast AND/OR bowel cancer No        Mammogram Screening - Mammogram every 1-2 years updated in Health Maintenance based on mutual decision making        3/8/2024   STI Screening   New sexual partner(s) since last STI/HIV test? No     History of abnormal Pap smear: NO - age 30-65 PAP every 5 years with negative HPV co-testing recommended        Latest Ref Rng & Units 4/2/2020     9:30 AM 4/2/2020     9:15 AM 11/12/2012     9:56 AM   PAP / HPV   PAP (Historical)   NIL  NIL    HPV 16 DNA NEG^Negative Negative      HPV 18 DNA NEG^Negative Negative      Other HR HPV NEG^Negative Negative        ASCVD Risk   The 10-year ASCVD risk score (Bridget SOL, et al., 2019) is: 1%    Values used to calculate the score:      Age: 53 years      Sex: Female      Is Non- : No      Diabetic: No      Tobacco smoker: No      Systolic Blood Pressure: 119 mmHg      Is BP treated: No      HDL Cholesterol: 55 mg/dL      Total Cholesterol: 179 mg/dL           Reviewed and updated as needed this visit by Provider                    BP Readings from Last 3 Encounters:   03/15/24 119/57   11/21/22 116/85   09/16/22 136/86    Wt Readings from Last 3 Encounters:   03/15/24 113.9 kg (251 lb)   11/21/22 110.7 kg (244 lb)   09/16/22 110.8 kg (244 lb 4.8 oz)                  Patient Active Problem List   Diagnosis    Family history of coronary artery disease     Hyperlipidemia LDL goal <160    Obesity    Hx of abnormal cervical Pap smear    Primary osteoarthritis of left knee    Morbid obesity (H)     Past Surgical History:   Procedure Laterality Date    ABDOMEN SURGERY      Tummy tuck    breast lift      breast lift and tummy tuck    BREAST SURGERY      Breast lift    COLONOSCOPY N/A 2022    Procedure: COLONOSCOPY, WITH POLYPECTOMIES using cold snare and regular forceps;  Surgeon: Jennifer Atkinson MD;  Location:  GI       Social History     Tobacco Use    Smoking status: Never     Passive exposure: Never    Smokeless tobacco: Never   Substance Use Topics    Alcohol use: Yes     Comment: rare     Family History   Problem Relation Age of Onset    Myocardial Infarction Mother          from heart attack at age of 73    Diabetes Mother         type II    Family History Negative Father         macular degeneration    Breast Cancer Maternal Grandmother         in her 70's     Family History Negative Sister         2-doing well    Cancer - colorectal Other         paternal aunt,  from this in her 60-70's-dad's sister    Colon Cancer No family hx of          Current Outpatient Medications   Medication Sig Dispense Refill    estradiol (CLIMARA) 0.0375 MG/24HR weekly patch Place 1 patch onto the skin once a week      scopolamine (TRANSDERM) 1 MG/3DAYS 72 hr patch Place 1 patch onto the skin every 72 hours 10 patch 0     No Known Allergies  Recent Labs   Lab Test 22  1430 20  0933   A1C 5.5  --    LDL  --  114*   HDL  --  55   TRIG  --  52   ALT  --  24   CR  --  0.95   GFRESTIMATED  --  71   GFRESTBLACK  --  82   POTASSIUM  --  4.5   TSH 1.60  --           Review of Systems  CONSTITUTIONAL: NEGATIVE for fever, chills, change in weight  INTEGUMENTARY/SKIN: NEGATIVE for worrisome rashes, moles or lesions  EYES: NEGATIVE for vision changes or irritation  ENT/MOUTH: NEGATIVE for ear, mouth and throat problems  RESP: NEGATIVE for significant cough or  "SOB  BREAST: NEGATIVE for masses, tenderness or discharge  CV: NEGATIVE for chest pain, palpitations or peripheral edema  GI: NEGATIVE for nausea, abdominal pain, heartburn, or change in bowel habits  : NEGATIVE for frequency, dysuria, or hematuria  MUSCULOSKELETAL: NEGATIVE for significant arthralgias or myalgia  NEURO: NEGATIVE for weakness, dizziness or paresthesias  ENDOCRINE: NEGATIVE for temperature intolerance, skin/hair changes  HEME: NEGATIVE for bleeding problems  PSYCHIATRIC: NEGATIVE for changes in mood or affect     Objective    Exam  /57 (BP Location: Right arm, Patient Position: Chair, Cuff Size: Adult Large)   Pulse 83   Temp 97.9  F (36.6  C) (Oral)   Resp 16   Ht 1.753 m (5' 9\")   Wt 113.9 kg (251 lb)   LMP 02/14/2024   SpO2 98%   BMI 37.07 kg/m     Estimated body mass index is 37.07 kg/m  as calculated from the following:    Height as of this encounter: 1.753 m (5' 9\").    Weight as of this encounter: 113.9 kg (251 lb).    Physical Exam  GENERAL: alert and no distress  EYES: Eyes grossly normal to inspection, PERRL and conjunctivae and sclerae normal  HENT: ear canals and TM's normal, nose and mouth without ulcers or lesions  NECK: no adenopathy, no asymmetry, masses, or scars  RESP: lungs clear to auscultation - no rales, rhonchi or wheezes  CV: regular rate and rhythm, normal S1 S2, no S3 or S4, no murmur, click or rub, no peripheral edema  ABDOMEN: soft, nontender, no hepatosplenomegaly, no masses and bowel sounds normal  MS: no gross musculoskeletal defects noted, no edema  SKIN: no suspicious lesions or rashes  NEURO: Normal strength and tone, mentation intact and speech normal  PSYCH: mentation appears normal, affect normal/bright        Signed Electronically by: Ramona Ann Aaseby-Aguilera, PA-C    "

## 2024-03-18 ENCOUNTER — LAB (OUTPATIENT)
Dept: LAB | Facility: CLINIC | Age: 54
End: 2024-03-18
Payer: COMMERCIAL

## 2024-03-18 ENCOUNTER — PATIENT OUTREACH (OUTPATIENT)
Dept: GASTROENTEROLOGY | Facility: CLINIC | Age: 54
End: 2024-03-18

## 2024-03-18 DIAGNOSIS — Z13.1 SCREENING FOR DIABETES MELLITUS: ICD-10-CM

## 2024-03-18 DIAGNOSIS — Z13.220 SCREENING, LIPID: ICD-10-CM

## 2024-03-18 DIAGNOSIS — Z13.0 SCREENING FOR BLOOD DISEASE: ICD-10-CM

## 2024-03-18 LAB
ALBUMIN SERPL BCG-MCNC: 4.2 G/DL (ref 3.5–5.2)
ALP SERPL-CCNC: 63 U/L (ref 40–150)
ALT SERPL W P-5'-P-CCNC: 13 U/L (ref 0–50)
ANION GAP SERPL CALCULATED.3IONS-SCNC: 10 MMOL/L (ref 7–15)
AST SERPL W P-5'-P-CCNC: 14 U/L (ref 0–45)
BILIRUB SERPL-MCNC: 0.3 MG/DL
BUN SERPL-MCNC: 16.7 MG/DL (ref 6–20)
CALCIUM SERPL-MCNC: 8.9 MG/DL (ref 8.6–10)
CHLORIDE SERPL-SCNC: 106 MMOL/L (ref 98–107)
CHOLEST SERPL-MCNC: 159 MG/DL
CREAT SERPL-MCNC: 1.04 MG/DL (ref 0.51–0.95)
DEPRECATED HCO3 PLAS-SCNC: 24 MMOL/L (ref 22–29)
EGFRCR SERPLBLD CKD-EPI 2021: 65 ML/MIN/1.73M2
ERYTHROCYTE [DISTWIDTH] IN BLOOD BY AUTOMATED COUNT: 12.1 % (ref 10–15)
FASTING STATUS PATIENT QL REPORTED: YES
GLUCOSE SERPL-MCNC: 100 MG/DL (ref 70–99)
HCT VFR BLD AUTO: 41.7 % (ref 35–47)
HDLC SERPL-MCNC: 56 MG/DL
HGB BLD-MCNC: 13.8 G/DL (ref 11.7–15.7)
LDLC SERPL CALC-MCNC: 91 MG/DL
MCH RBC QN AUTO: 31 PG (ref 26.5–33)
MCHC RBC AUTO-ENTMCNC: 33.1 G/DL (ref 31.5–36.5)
MCV RBC AUTO: 94 FL (ref 78–100)
NONHDLC SERPL-MCNC: 103 MG/DL
PLATELET # BLD AUTO: 260 10E3/UL (ref 150–450)
POTASSIUM SERPL-SCNC: 3.8 MMOL/L (ref 3.4–5.3)
PROT SERPL-MCNC: 6.7 G/DL (ref 6.4–8.3)
RBC # BLD AUTO: 4.45 10E6/UL (ref 3.8–5.2)
SODIUM SERPL-SCNC: 140 MMOL/L (ref 135–145)
TRIGL SERPL-MCNC: 61 MG/DL
WBC # BLD AUTO: 7.4 10E3/UL (ref 4–11)

## 2024-03-18 PROCEDURE — 80053 COMPREHEN METABOLIC PANEL: CPT

## 2024-03-18 PROCEDURE — 85027 COMPLETE CBC AUTOMATED: CPT

## 2024-03-18 PROCEDURE — 80061 LIPID PANEL: CPT

## 2024-03-18 PROCEDURE — 36415 COLL VENOUS BLD VENIPUNCTURE: CPT

## 2024-04-29 ENCOUNTER — ANCILLARY PROCEDURE (OUTPATIENT)
Dept: MAMMOGRAPHY | Facility: CLINIC | Age: 54
End: 2024-04-29
Payer: COMMERCIAL

## 2024-04-29 PROCEDURE — 77063 BREAST TOMOSYNTHESIS BI: CPT | Mod: TC | Performed by: RADIOLOGY

## 2024-04-29 PROCEDURE — 77067 SCR MAMMO BI INCL CAD: CPT | Mod: TC | Performed by: RADIOLOGY

## 2024-07-25 DIAGNOSIS — T75.3XXS MOTION SICKNESS, SEQUELA: ICD-10-CM

## 2024-07-26 RX ORDER — SCOLOPAMINE TRANSDERMAL SYSTEM 1 MG/1
PATCH, EXTENDED RELEASE TRANSDERMAL
Qty: 10 PATCH | Refills: 0 | Status: SHIPPED | OUTPATIENT
Start: 2024-07-26

## 2024-08-26 ENCOUNTER — OFFICE VISIT (OUTPATIENT)
Dept: VASCULAR SURGERY | Facility: CLINIC | Age: 54
End: 2024-08-26
Payer: COMMERCIAL

## 2024-08-26 DIAGNOSIS — I83.811 VARICOSE VEINS OF RIGHT LOWER EXTREMITY WITH PAIN: Primary | ICD-10-CM

## 2024-08-26 PROCEDURE — 99203 OFFICE O/P NEW LOW 30 MIN: CPT | Performed by: INTERNAL MEDICINE

## 2024-08-26 NOTE — PROGRESS NOTES
Vein Clinic Consultation Note    HPI:  Nina Sommers is a 54 year old female who was seen today in consultation for right thigh itching varicose veins.  She has noticed more discomfort and prominence of the varicose veins.  Her left leg feels okay.      ROS    PHH:  No past medical history on file.     Past Surgical History:   Procedure Laterality Date    ABDOMEN SURGERY      Tummy tuck    breast lift      breast lift and tummy tuck    BREAST SURGERY      Breast lift    COLONOSCOPY N/A 2022    Procedure: COLONOSCOPY, WITH POLYPECTOMIES using cold snare and regular forceps;  Surgeon: Jennifer Atkinson MD;  Location:  GI       ALLERGIES:  Patient has no known allergies.    No Known Allergies    MEDS:    Current Outpatient Medications:     estradiol (CLIMARA) 0.0375 MG/24HR weekly patch, Place 1 patch onto the skin once a week, Disp: , Rfl:     scopolamine (TRANSDERM) 1 MG/3DAYS 72 hr patch, APPLY 1 PATCH TOPICALLY TO THE SKIN EVERY 72 HOURS, Disp: 10 patch, Rfl: 0    SOCIAL HABITS:    History   Smoking Status    Never   Smokeless Tobacco    Never     Social History    Substance and Sexual Activity      Alcohol use: Yes        Comment: rare      History   Drug Use No       FAMILY HISTORY:    Family History   Problem Relation Age of Onset    Myocardial Infarction Mother          from heart attack at age of 73    Diabetes Mother         type II    Family History Negative Father         macular degeneration    Breast Cancer Maternal Grandmother         in her 70's     Family History Negative Sister         2-doing well    Cancer - colorectal Other         paternal aunt,  from this in her 60-70's-dad's sister    Colon Cancer No family hx of          Results for orders placed or performed in visit on 24   MA Screen Bilateral w/Ed    Narrative    BILATERAL FULL FIELD DIGITAL SCREENING MAMMOGRAM WITH TOMOSYNTHESIS    Performed on: 24    Compared to: 2023, 2022, 2021, and  03/09/2020    Technique:  This study was evaluated with the assistance of Computer-Aided   Detection.  Breast Tomosynthesis was used in interpretation.    Findings: The breasts are almost entirely fatty.  There is no radiographic   evidence of malignancy.     Impression    IMPRESSION: ACR BI-RADS Category 1: Negative    BREAST CANCER SCREENING RECOMMENDATION: Routine yearly mammography   beginning at age 40 or as discussed with your provider.    The results and recommendations of this examination will be communicated   to the patient.        Sandeep Kimble MD         VEIN CLINIC LEG DRAWING    Patient History  If you have varicose or spider veins/legs, when did they occur?: Other:  Other:: Right leg VV with itching, noticed this summer. Also spider veins both legs.  Please describe your expectations of therapy:: eliminate or reduce the appearance of the veins    Past Treatment:  Have you ever been treated for the above problem(s)? : No  Have you ever worn prescription stockings?: Yes  Do you currently wear compression stockings?: No  Length of time compression stockings worn:: during long flights  Do you use medications to relieve your leg pain?: No  Do your symptoms, caused by VV or Spider Veins, interfere with work or activities of daily living?: No    For Varicose and Spider Veins of the Legs:  Side:: Right  Do you have now:: -- (itching right anterior thigh. a little achiness.)    Do you have a history of any of the following?: Family history of varicose/spider veins (family hx - sister.)  Side:: Left    VCSS  PAIN:: 0  Varicose Veins:: 0  Venous Edema:: 0  Skin Pigmentation:: 0  Inflamation:: 0  Induration:: 0  Number of active ulcers:: 0  Active ulcer duration:: 0  Active ulcer diameter:: 0  Compression Therapy:: 0  VCSS Score:: 0    CEAP:  CEAP:: C1         ASSESSMENT: Symptomatic right thigh varicose veins, possibly from anterior accessory saphenous vein source.  Has not attempted compression stockings at  this point.    PLAN: Venous competency testing of the right leg to assess whether anterior excessively saphenous vein and greater saphenous vein reflux contribute.  Attempt compression stockings and follow-up with me in 3 months.      Janak Price MD    30-minute spent today reviewing chart, discussing with patient and postvisit charting.

## 2024-08-26 NOTE — NURSING NOTE
Patient Reported symptoms:    Right leg   Heaviness None of the time   Achiness A little of the time  Swelling None of the time   Throbbing None of the time   Itching Some of the time   Appearance Moderately noticeable   Impact on work/activities Symptoms but full able to participate    Left Leg   Heaviness None of the time   Achiness None of the time   Swelling None of the time   Throbbing None of the time   Itching None of the time   Appearance Slightly noticeable   Impact on work/activities Symptoms but full able to participate

## 2024-08-26 NOTE — LETTER
2024      Nina Sommers  [address] [place] [address]      Dear Colleague,    Thank you for referring your patient, Nina Sommers, to the [facility] [place]. Please see a copy of my visit note below.        Vein Clinic Consultation Note    HPI:  Nina Sommers is a 54 year old female who was seen today in consultation for right thigh itching varicose veins.  She has noticed more discomfort and prominence of the varicose veins.  Her left leg feels okay.      ROS    PHH:  No past medical history on file.     Past Surgical History:   Procedure Laterality Date     ABDOMEN SURGERY      Tummy tuck     breast lift      breast lift and tummy [facility]     BREAST SURGERY      Breast lift     COLONOSCOPY N/A 2022    Procedure: COLONOSCOPY, WITH POLYPECTOMIES using cold snare and regular forceps;  Surgeon: Jennifer Atkinson MD;  Location:  GI       ALLERGIES:  Patient has no known allergies.    No Known Allergies    MEDS:    Current Outpatient Medications:      estradiol (CLIMARA) 0.0375 MG/24HR weekly patch, Place 1 patch onto the skin once a week, Disp: , Rfl:      scopolamine (TRANSDERM) 1 MG/3DAYS 72 hr patch, APPLY 1 PATCH TOPICALLY TO THE SKIN EVERY 72 HOURS, Disp: 10 patch, Rfl: 0    SOCIAL HABITS:    History   Smoking Status     Never   Smokeless Tobacco     Never     Social History    Substance and Sexual Activity      Alcohol use: Yes        Comment: rare      History   Drug Use No       FAMILY HISTORY:    Family History   Problem Relation Age of Onset     Myocardial Infarction Mother          from heart attack at age of 73     Diabetes Mother         type II     Family History Negative Father         macular degeneration     Breast Cancer Maternal Grandmother         in her 70's      Family History Negative Sister         2-doing well     Cancer - colorectal Other         paternal aunt,  from this in her 60-70's-dad's sister     Colon Cancer No family hx of           Results for orders placed or performed in visit on 04/29/24   MA Screen Bilateral w/Ed    Narrative    BILATERAL FULL FIELD DIGITAL SCREENING MAMMOGRAM WITH TOMOSYNTHESIS    Performed on: 4/29/24    Compared to: 04/17/2023, 04/04/2022, 03/22/2021, and 03/09/2020    Technique:  This study was evaluated with the assistance of Computer-Aided   Detection.  Breast Tomosynthesis was used in interpretation.    Findings: The breasts are almost entirely fatty.  There is no radiographic   evidence of malignancy.     Impression    IMPRESSION: ACR BI-RADS Category 1: Negative    BREAST CANCER SCREENING RECOMMENDATION: Routine yearly mammography   beginning at age 40 or as discussed with your provider.    The results and recommendations of this examination will be communicated   to the patient.        Sandeep Kimble MD         VEIN CLINIC LEG DRAWING    Patient History  If you have varicose or spider veins/legs, when did they occur?: Other:  Other:: Right leg VV with itching, noticed this summer. Also spider veins both legs.  Please describe your expectations of therapy:: eliminate or reduce the appearance of the veins    Past Treatment:  Have you ever been treated for the above problem(s)? : No  Have you ever worn prescription stockings?: Yes  Do you currently wear compression stockings?: No  Length of time compression stockings worn:: during long flights  Do you use medications to relieve your leg pain?: No  Do your symptoms, caused by VV or Spider Veins, interfere with work or activities of daily living?: No    For Varicose and Spider Veins of the Legs:  Side:: Right  Do you have now:: -- (itching right anterior thigh. a little achiness.)    Do you have a history of any of the following?: Family history of varicose/spider veins (family hx - sister.)  Side:: Left    VCSS  PAIN:: 0  Varicose Veins:: 0  Venous Edema:: 0  Skin Pigmentation:: 0  Inflamation:: 0  Induration:: 0  Number of active ulcers:: 0  Active ulcer  duration:: 0  Active ulcer diameter:: 0  Compression Therapy:: 0  VCSS Score:: 0    CEAP:  CEAP:: C1         ASSESSMENT: Symptomatic right thigh varicose veins, possibly from anterior accessory saphenous vein source.  Has not attempted compression stockings at this point.    PLAN: Venous competency testing of the right leg to assess whether anterior excessively saphenous vein and greater saphenous vein reflux contribute.  Attempt compression stockings and follow-up with me in 3 months.      Janak Price MD    30-minute spent today reviewing chart, discussing with patient and postvisit charting.      Again, thank you for allowing me to participate in the care of your patient.        Sincerely,        Janak Price MD

## 2024-08-26 NOTE — PATIENT INSTRUCTIONS
Knee High, medical grade, 20-30 mmHg strength, compression stockings are recommended. .    Options for purchasing compression stockings:    You can call your insurance company and ask if compression stockings are covered.  They usually fall under your Durable Medical Equipment (DME) coverage, if covered. The DME codes if they ask are: Knee high , Thigh high , Panty .   Be sure to ask if you need a specific diagnosis for coverage, if your deductible needs to be met first, how many pairs are covered and how often.  If insurance covers and you would like to order from [facility] [employer], or another medical supply company: call the clinic back and we can fax a prescription to your medical supply company of choice. They will bill your insurance and ship them to you. We will ask you color choice (black or beige), and toe choice (open or closed toe).    We sell many sizes in our clinic (except specialty order or petite sizing). We can check to see if we have your size.  Knee high ([employer] brand) are $60/pair  Thigh high ([employer] brand) are $85/pair.   If you would like to purchase from the clinic, let us know including your color choice (black or beige), and toe choice (open or closed toe), and we will set them aside for you to  and pay for at your next clinic appointment or the day of your procedure.    Online website ordering options:   [email or web address]  [email or web address] has many options available.

## 2024-11-18 ENCOUNTER — ANCILLARY PROCEDURE (OUTPATIENT)
Dept: ULTRASOUND IMAGING | Facility: CLINIC | Age: 54
End: 2024-11-18
Attending: INTERNAL MEDICINE
Payer: COMMERCIAL

## 2024-11-18 ENCOUNTER — OFFICE VISIT (OUTPATIENT)
Dept: VASCULAR SURGERY | Facility: CLINIC | Age: 54
End: 2024-11-18
Attending: INTERNAL MEDICINE
Payer: COMMERCIAL

## 2024-11-18 DIAGNOSIS — I83.811 VARICOSE VEINS OF RIGHT LOWER EXTREMITY WITH PAIN: ICD-10-CM

## 2024-11-18 DIAGNOSIS — I83.811 VARICOSE VEINS OF RIGHT LOWER EXTREMITY WITH PAIN: Primary | ICD-10-CM

## 2024-11-18 PROCEDURE — 93971 EXTREMITY STUDY: CPT | Mod: RT | Performed by: INTERNAL MEDICINE

## 2024-11-18 NOTE — PATIENT INSTRUCTIONS
Pre-Procedure Instructions:                           VNUS Closure and Phlebectomies   You are having a Closure(s) - where one or more veins are closed and Phlebectomies - where one or more veins are removed.   Insurance  Precertification and/or referral authorization may be required by your insurance company.  We will call your insurance company to verify benefits for the medically necessary part of your procedure.    Your Current Medications and Allergies  To reduce bruising, please do not take aspirin-type medications (Motrin, Aleve, Ibuprofen, Advil, etc.) for three days before your procedure. You may take Tylenol if you need a pain reliever.  Are you on blood thinner medications? (Plavix, Coumadin, Eliquis, Xarelto) Please discuss this with your surgeon. You may resume taking your blood thinner medication after your procedure.  Are you sensitive to latex or adhesives used for fake fingernails? Please let us know!    Driving Escort and   Please arrange to have a trusted adult (18 years old or older) drive you to and from the clinic.  For your safety, we recommend you have a trusted adult to stay with you until the next morning.    Your Health  If you have a change in your health before the procedure, contact our office immediately. (For example: cold symptoms, cough, urinary tract infection, fever, flu symptoms.)  A pre-procedure physical is not required.    Note  It is sometimes necessary to adjust the procedure schedule due to emergencies. We greatly appreciate your flexibility and understanding in this matter.                  Check List: The Morning of Your Procedure  ___1. Please do not put anything on your leg(s) or shave the day of your procedure.  ___2. You may take your normal medications the day of your procedure.  ___3. It is recommended you eat a light breakfast or lunch the day of your procedure.  ___4. Wear comfortable loose-fitting clothing and wide-fitting shoes (i.e. tennis shoes,  slip-ons).  ___5. Please arrive at our clinic at the specified time given by the nurse.  ___6. You will sign an affirmation of informed consent.  ___7. Bring your pre-procedure sedation medication (lorazepam and clonidine) with you to the clinic.              One hour before your procedure, you will be instructed to take these medications. The lorazepam              (Ativan) lowers anxiety and sedates you; the clonidine makes the lorazepam more effective. Everyone's              body processes these medications differently. Therefore, reactions to these medications vary. Some              people stay awake and some people sleep through the whole procedure. You may not remember              everything about the procedure or the day. You do not want to make any big decisions for the rest of the              day.    The Day of Your Procedure:       VNUS Closure and Phlebectomies  In the Exam Room  A nurse will bring you back to an exam room with your family member or friend. This is when your informed consent will be signed, and you will take your pre-procedure medications.  You will be asked to remove everything from the waist down, including undergarments. You will then put on a hospital gown or shorts and blue booties.  Your surgeon will come in to answer any questions and amanda any bulging varicose veins to be removed.  You will be taken to the restroom to empty your bladder before going into the procedure room.    In the Procedure Room  You will be escorted to the procedure room. You will lie on a procedure table covered with a sheet or blanket.  A nurse will put a blood pressure cuff on your arm and a pulse/oxygen monitor on a finger. Your vital signs will be monitored every 15 minutes.  Your gown will be pulled up slightly and the groin exposed for a short period of time. The surgeon's assistant will clean your foot, leg, and groin with an antibacterial solution. We will get you covered up as quickly as  possible!  Sterile towels and blue drapes will be used to cover you and the table. You will be asked to keep your hands under the blue drapes during the procedure.  The lights will be turned down. The table will be tipped so your head is higher than your feet. You may feel like you're going to slide off, but you won't.    The Procedure  The surgeon will visualize your veins with an ultrasound machine. He or she will then numb your skin and access the vein. A catheter is passed up the vein and positioned with ultrasound guidance. The table will then be tipped head down.  Once the catheter is in the correct position, medication will be injected to numb your leg. You will feel some needle sticks and may feel discomfort as the medication goes in. Once this is done, you should not experience significant discomfort. But if you do, please let us know and more numbing medication can be injected. As the catheter sends out heat, the vein closes off and the catheter is withdrawn.  For the phlebectomy part of the procedure, small incisions are made where the bulging varicose veins have been marked on your leg(s) and these veins will be removed using a small tish hook instrument.    Post-Procedure  Once the procedure is done, your leg(s) will be washed with warm water and dried. Your leg(s) will be bandaged with large soft dressings and a large ACE bandage wrapped from toes to groin.   You will be offered something to drink and a light snack.  You will rest with your leg(s) elevated for approximately 30 minutes. Your friend or family member may join you.  For your safety, you will be taken to your car in a wheelchair. If you are able to, it is good to keep your leg(s) elevated on the car ride home.    Post-Procedure Instructions:             VNUS Closure and Phlebectomies      Post-Op Day Zero - The Day of Your Procedure:0  1. Medication for Pain Control and Inflammation Control   - The numbing medication injected during your  procedure will last for several hours. The pre-procedure                 tablets may make you very sleepy and you might not remember everything from the procedure or from                 the day. This will usually wear off by the next day.   - Ibuprofen:  If tolerated, take ibuprofen (e.g., Advil) to reduce inflammation whether or not you have                 pain. For three days, take two tablets (200 mg each) with every meal and at bedtime with a snack. If                 your pain is not controlled with ibuprofen, you may take prescription pain medication (such as Norco),                 if prescribed.   - You may resume taking any medications you were taking before your procedure.  2. Activity   - Rest with your leg(s) elevated above your heart. This will prevent from a lot of swelling and                 bleeding. You do not need to elevate your leg(s) while sleeping at night. You may go upstairs, sit up to                 eat, use the bathroom, and take several five-minute walks. Otherwise, keep your leg(s) elevated.                 Minimize the amount of time you are up on your feet to about 30 minutes at a time.  3. Bandages   - The incision sites will be covered with soft bandages and an ACE wrap. Keep your bandages on                 and dry for 48 hours. The ACE should provide  snug  compression but should not cause pain or                 numbness in the toes. If you have significant discomfort or your toes become cold or numb, unwrap                 your ACE and rewrap with less tension starting at the toes wrapping upward.  4. Incisions   - Bleeding: You may see some incision sites that are oozing through the bandages. This is not unusual      and can be managed with Rest, Ice, Compression and Elevation (RICE). Apply ice and firm pressure      directly to the site that is bleeding and rest with your leg(s) elevated above your heart for 20-30                 minutes.    Post-Op Day One:  1.  Medication   - Ibuprofen: Continue the same as the Day of Your Procedure. If your pain is not controlled with                 ibuprofen, you may take prescription pain medication (such as Norco), if prescribed.   2. Activity   - We would like you to get up at least six times and walk around for short periods of time, unless it is      causing you pain. You should not be on your feet more than 90 minutes at a time. Elevate your leg      above your heart when you are not walking.  3. Bandages   - Your bandages must be kept on and dry for 48 hours.  4. Driving   - You may resume driving when you can do so safely. Do not drive if you are taking narcotic pain      medication.  Post-Op Day Two:   1. Medication  - Ibuprofen: Continue the same as the Day of Your Procedure.  2.  Activity  - Walk as tolerated. Elevate as much as possible when not walking.  3. Bandages and Compression  - Remove ACE wrap and padding. Shower and put on your compression hose during waking hours only       for at least 5 days. (Your doctor may instruct you to keep your bandages on until your return     appointment; please follow your doctor's instructions.)  4. Incisions  - Your leg(s) will be bruised; there may be swelling, hard knots under the skin and possibly some     numbness. These will likely resolve over time. If you see  hair-like  strings coming out of your     incisions, do not pull them (this will only cause pain/discomfort). We will trim them when you come     back for your follow-up appointment.  5. Call Us If:   - You see any areas on your leg that are red and angry in appearance.   - You notice any drainage that is milky or cloudy in appearance or that has a foul odor.   - You run a temperature of 100.5 or greater.    Post-Op Day Three:  You will have a follow up appointment 2-4 days post-procedure. At this appointment, you will have an ultrasound and we will check your incisions.     ________________________________________________________________________________________    The Two Weeks Following Your Procedure  1.  Skin Care   - Do not use any lotions, creams or powders on your incisions for 14 days or until the incisions have                 healed.   - Do not soak in a bathtub, hot tub or go swimming for 14 days or until your incisions have healed.  2.  Medications   - You may use ibuprofen or acetaminophen (e.g., Tylenol) as needed for pain or discomfort.  3.  Activity   - Do not lift over 25 pounds. After about two weeks you may resume exercise such as aerobics,                 running, tennis or weightlifting. Use your common sense and ease back into your exercise routine                 slowly.   - You may feel a cord-like tightness along the inside of your leg. Gentle stretching can be helpful.  4. Compression Hose   - Your doctor may instruct you to wear compression for longer than seven days; please follow your                 doctor's instructions. As a comfort measure, you may choose to wear compression for longer than                 required.  5.  Travel   - Do not fly in an airplane for 14 days after your procedure. If you have a long car trip planned within                 two to three weeks following your procedure, stop and walk for a few minutes every two hours.      Periodic ankle pumps during the ride may be helpful.    Six Week Appointment  - At your six-week appointment, you will see your surgeon for an exam and evaluation. This office visit     will be scheduled when you return for post-op day three return appointment.       Return to Work  1.  If you work outside the home, you may return to work in a few days depending on the extent of your        procedure, how you tolerate it, and the type of work you perform.  2.  Paperwork: If your employer requires paperwork or you would like a letter written to your employer, please        let us know. We will complete  disability type forms at no charge. Please allow five business days for forms        to be completed.          Sclerotherapy: Pre-Treatment Instructions    Time Required per Treatment Session - About 45 minutes  Please come in 15 minutes before your scheduled appointment.  30 min.  Sclerotherapy treatments last approximately 30 minutes.  5 min.    A staff member will wipe your legs off with warm water and dry them with a wash cloth.                 Then you can put your compression hose on, get dressed and check out.  10 min.  After your treatment, you will be asked to walk around for 10 minutes before you get in your car.    Medications  Five days before your appointment, discontinue aspirin (Bufferin, Anacin, etc.) and Ibuprofen (Motrin, Advil, Aleve, etc.) to reduce bruising. Resume these medications the day following the treatment.    Leg Preparation  Do not shave your legs or apply any oil, lotion or powder the night before or the day of your treatment.    Clothing  Shorts:  Bring a pair of loose, comfortable shorts to wear during your treatment (or you can choose to wear ours). Shoes: Bring comfortable shoes to accommodate the compression hose after your treatment. Do not wear flip-flops or thong-style sandals unless you have open-toe compression hose.    Photographs  Photos will be taken before each treatment. This helps monitor your progress.    Injections  The physician will inject your veins with the sclerotherapy solution chosen to meet your specific needs.    Compression Hose  Please bring your compression hose if you have them. They may also be reserved for you at our clinic. Compression hose must be worn immediately after each sclerotherapy treatment.  The hose must be compression level 20-30, and they must be worn for 24 hours straight after your treatment.  If you have never worn compression hose before, a staff member will teach you how to put them on.             You cannot have a treatment without  compression hose.               They are critical to the success of your treatment!    You may purchase your compression hose from us. We will measure you and have the hose available when you come for your treatment.    Cancellation and Rescheduling  If you need to cancel or reschedule your sclerotherapy treatment, please give our office at least 24 hour notice.    Sclerotherapy: Basic Information    What is sclerotherapy?  Sclerotherapy is a treatment for  spider  veins.  Spider  veins are small veins just under your skin that can look red, blue or purple. Most  spider  veins are only a cosmetic problem.  Spider  veins are not useful and treating them will not affect your circulation.    How does sclerotherapy work?  1.  Injections: A very small needle is used to inject a solution into the  spider  veins. The solution irritates the cells that line the vein walls. This causes the veins to collapse. The vein walls to stick together and they can no longer carry blood. Different solutions are used based on the size of the veins.  2.  Compression:  The spider veins are kept collapsed by wearing compression stockings. Your body will break down and absorb the treated veins. You wear the compression hose for 24 hours after the treatment and then for 4 more days during your waking hours only.    How does the body heal after sclerotherapy?  The process is similar to how your body heals after a bad bruise. It takes 4-6 weeks or more for the healing to be complete. When the healing is complete, the vein is no longer visible. It may take more than one treatment.    How do I get the best results?  It is important to follow the post-sclerotherapy instructions. The best results require time and patience. The injection sites will continue to heal and fade for months after a treatment. Please discuss your expectations with your doctor to keep them realistic. Your doctor will do everything possible to meet or exceed your  expectations.    How many treatments are needed?  After your initial exam, your doctor will give you an estimate of the number of treatments that may be required. It depends upon the size, type, and quantity of your  spider  veins and on the doctor's assessment, your history and expectations. You may end up needing fewer or more treatments.    How soon can I have another treatment?  Additional treatments are scheduled every 4-6 weeks to allow time for the body to respond to the previous treatment.    Common Side Effects:  Itching  The areas that were injected may itch. This is usually mild and lasts less than a day. Do not use lotions or creams on your legs until the injection sites have healed over.    Pain  It is common to have some tenderness at the injection sites. Injection of the solution can be uncomfortable, but is usually well tolerated by most patients. The tenderness is temporary, lasting 24 hours at most. Tylenol or Ibuprofen can be used, if needed, following the product directions.    Bruising  This may occur at the injections sites. Bruising may be minimized by avoiding Aspirin and Ibuprofen products for five days before each treatment session.    Hyperpigmentation  A light brown discoloration of the skin may develop along the veins in the areas injected. Approximately 20-30% of patients treated note the discoloration (which is lighter and less obvious than the veins that are being treated). The hyperpigmentation usually fades in a couple of weeks, but may take several months to a year to totally resolve. There is a 1% chance of hyperpigmentation continuing after one year.    Trapped blood  A small amount of blood may become trapped and hardened in the veins. This may feel like a knot or cord and it may look dark blue or bruised. This is a common occurrence. You may need to return before your next treatment so this area can be drained to remove the trapped blood. This will reduce the hyperpigmentation  that can occur. The chance of this occurring can be decreased with proper use of compression hose after your treatment.    Matting  Matting is the formation of new, fine  spider  veins in the area injected.  It occurs in approximately 10% of patients injected. The exact reason for this is unknown. If untreated, the matting usually resolves in 3 to 12 months, but very rarely, it can be permanent. If the matting does not fade, it can be re-injected.    Rare Side Effects:  Ulceration at injection sites  Very rarely, a small ulcer will occur at the site where a vein is injected. An ulcer can take 4 to 6 weeks to completely heal. A small scar may result.    Allergic reactions  There is a very rare incidence of an allergic reaction to the solution injected. You will be observed for such reaction and will be treated appropriately should it occur. Please inform us of any allergy history.    Pulmonary embolus/Deep vein thrombosis  This is a blood clot which moves to the lungs/a blood clot in the deep vein system. There is an extraordinarily low incidence of this complication.      SCLEROTHERAPY AFTER CARE  Immediately:  After treatment, walk for 10-15 minutes before getting in your car.  If your trip home is more than 1 hour, stop and walk around for 5-10 minutes. Avoid sitting or standing for extended periods.   First 24 hours: Wear your compression continuously, even while in bed. After the 24 hours, you may shower if you want to. Put your hose back on, unless you are going to bed. You should NOT wear compression to bed after the first 24 hours. You may fly the next day, but wear your compression.   For 5 days: Wear the compression hose for waking hours only. You may continue to wear them longer than 5 days if you prefer.   For days 5-7: Walking is encouraged, as it promotes efficient circulation in your veins. You may do activities that raise your heart rate, but do NOT run, jog, do high impact aerobics, or weight  lifting. After 7 days, no activity restrictions.  Shaving: Wait a few days to shave or apply lotion.   Bathing: Do NOT take hot baths or sit in a hot tub for 7-10 days.    For 1 year: Wear SPF 30 sunscreen on your legs when in the sun. This is very important! It helps prevent darkening of the skin at the injection sites.   Medications: You may resume your usual medications, including aspirin or ibuprofen.    Common Things to Expect       Compression must be worn for the first 24 hours and then during the day for 5-7 days.    If larger veins are treated with ultrasound-guided sclerotherapy, you will have redness, firmness, tenderness, and swelling.  This firmness and tenderness may take 3-6 months to resolve. Ibuprofen and compression hose will aid in this process.    You will have bruising that can last up to 3 weeks. Most fading of the veins will occur between 3 and 6 weeks after treatment.    You may notice brown discoloration (hyperpigmentation) at the treatment site.  This should fade with time, but will take 3 months to 1 year to fully heal.     Some treated veins may look darker because of trapped blood within the vein. This trapped blood can be removed at a minimum of 1 month following treatment. Larger veins are more likely to develop trapped blood.    It is very important for you to use at least SPF 30 sunscreen in order to help prevent the discoloration of your skin.    Migraines rarely occur following sclerotherapy, but are more likely in patients with a history of migraines.  Treat as you would any other migraine.

## 2024-11-18 NOTE — LETTER
2024      Nina Sommers  [address] [place] [address]      Dear Colleague,    Thank you for referring your patient, Nina Sommers, to the [facility] [place]. Please see a copy of my visit note below.        Vein Clinic Consultation Note    HPI:  Nina Sommers is a 54 year old female who was seen today in consultation for right thigh and calf symptomatic varicose veins, refractory to compression stockings greater than 3 months.    The compression stockings have not helped very much which is understandable since her reflux and source of the varicosities is anterior accessory saphenous vein and the compression stockings do not address this area.    She still has itching and discomfort associated with the varicosities.  They have continued to progress over the last 3 years.      ROS    PHH:  No past medical history on file.     Past Surgical History:   Procedure Laterality Date     ABDOMEN SURGERY      Tummy tuck     breast lift      breast lift and tummy [facility]     BREAST SURGERY      Breast lift     COLONOSCOPY N/A 2022    Procedure: COLONOSCOPY, WITH POLYPECTOMIES using cold snare and regular forceps;  Surgeon: Jennifer Atkinson MD;  Location:  GI       ALLERGIES:  Patient has no known allergies.    No Known Allergies    MEDS:    Current Outpatient Medications:      estradiol (CLIMARA) 0.0375 MG/24HR weekly patch, Place 1 patch onto the skin once a week, Disp: , Rfl:      scopolamine (TRANSDERM) 1 MG/3DAYS 72 hr patch, APPLY 1 PATCH TOPICALLY TO THE SKIN EVERY 72 HOURS, Disp: 10 patch, Rfl: 0    SOCIAL HABITS:    History   Smoking Status     Never   Smokeless Tobacco     Never     Social History    Substance and Sexual Activity      Alcohol use: Yes        Comment: rare      History   Drug Use No       FAMILY HISTORY:    Family History   Problem Relation Age of Onset     Myocardial Infarction Mother          from heart attack at age of 73     Diabetes Mother          type II     Family History Negative Father         macular degeneration     Breast Cancer Maternal Grandmother         in her 70's      Family History Negative Sister         2-doing well     Cancer - colorectal Other         paternal aunt,  from this in her 60-70's-dad's sister     Colon Cancer No family hx of          Results for orders placed or performed in visit on 24   US Venous Competency Right    Narrative    Name:  Nina Sommers                                              Patient   ID: [id number]  Date: 2024                                          :   1970  Sex: female    Impression        Examined by: LIAT Dangelo RVT  Age:  54 year old                                                           Reading MD: LIONEL Price     INDICATION: varicose veins with pain     EXAM TYPE  RIGHT LOWER EXTREMITY VENOUS DUPLEX FOR VENOUS INSUFFICIENCY  TECHNICAL SUMMARY     A duplex ultrasound study using color flow was performed to evaluate the   right lower extremity veins for valvular incompetence with the patient in   a steep reversed Trendelenburg.      RIGHT:     The deep veins demonstrate phasic flow, compress, and respond to   augmentations.  There is no reflux or DVT.  The common femoral is   incompetent and free of thrombus. The remaining deep veins are competent   and free of thrombus.      The GSV demonstrates phasic flow, compresses, and responds to   augmentations from the saphenofemoral junction to the ankle with no   evidence of thrombus. The great saphenous vein measures 7.0 mm at the   saphenofemoral junction, 5.3 mm at the proximal thigh, and 3.8 mm at the   knee. The GSV is incompetent from SFJ to Mid Thigh, with the greatest   reflux time of 4.5 seconds.  The GSV gives rise to a varicose branch   measuring 3.0 mm off the Proximal Calf that courses Toward the ankle with   a reflux time of 2.1 seconds.     The AASV is incompetent (5.1 mm) draining into the saphenofemoral   junction.  The AASV is incompetent from the saphenofemoral junction to the   proximal thigh with a reflux time of 4.6 seconds. The AASV takes a   straight course for 4 cm. The AASV gives rise to a varicose branch   measuring 8.6 mm off the Proximal Thigh that courses Toward the ankle with   a reflux time of 4.6 seconds.     The Giacomini vein is competent (1.8 mm) communicating with the small   saphenous vein at the knee level.      The SSV demonstrates phasic flow, compresses, and responds to   augmentations from the popliteal space to the ankle.  No reflux or   thrombus is seen. The SPJ is not visualized.      Perforators: There is no evidence of incompetent  veins at any   level.     LEFT:     The CFV demonstrates phasic flow, compresses, and responds to   augmentations.  There is no DVT.       FINAL SUMMARY:    No DVT.  Competent right deep system  Incompetent right GSV with associated refluxing varicose veins.  Incompetent right AASV with associated refluxing varicose veins.     Incompetence Criteria: Greater than 0.5 seconds in the superficial and    veins and greater than 1.0 second in the deep veins.               VEIN CLINIC LEG DRAWING    Side:: Right    VCSS  PAIN:: 0  Varicose Veins:: 1  Venous Edema:: 0  Skin Pigmentation:: 0  Inflamation:: 0  Induration:: 0  Number of active ulcers:: 0  Active ulcer duration:: 0  Active ulcer diameter:: 0  Compression Therapy:: 2  VCSS Score:: 3    CEAP:  CEAP:: C2         ASSESSMENT: Symptomatic right thigh varicose veins associated with anterior accessory saphenous vein reflux.  Also concomitant greater saphenous vein reflux.  Refractory to conservative therapy with compression stockings greater than 3 months, this is in a territory that is not well treated by compression.  The right thigh varicose veins have progressive discomfort and itching, worse when standing for prolonged periods of time and requiring breaks to rest and elevate feet.    PLAN:  Radiofrequency ablation of short anterior accessory saphenous vein on the right, may be technically challenging and requires sclerotherapy and phlebectomy of the outflow varicose veins of the right thigh at the same time.  Radiofrequency ablation of the secondarily compromised right greater saphenous vein.      Janak Price MD    30 minutes spent today reviewing chart, discussing with patient and postvisit charting.      Again, thank you for allowing me to participate in the care of your patient.        Sincerely,        Janak Price MD

## 2024-11-18 NOTE — PROGRESS NOTES
November 18, 2024    Vein Procedure Recommendation    Spoke with patient in clinic.    Dr. Price has recommended patient to have the following vein procedure(s):     1. Right leg VNUS closure GSV, ASV, <10 Phlebectomies (medically necessary), and Ultrasound Guided Sclerotherapy (medically necessary)      Patient Pre-op Questions:  [facility]erred [facility] [address]  Anticoagulant/ASA: No  Artificial Joint or Heart Valve:  No  Open ulcer:  No  Sedation nausea: No      Patient is recommended to wear Thigh High compression hose following her procedure. Discussed compression hose. Explained to patient if insurance doesn't cover the compression hose there are a couple different options to getting them and to call the clinic to let us know if they need help. Patient going to look into thigh high vs. Pantyhose and will call if she needs assistance in getting a pair. [facility]     Handed patient written procedure instructions to review on her own (see After Visit Summary).    Next steps:    Insurance Submission  Informed patient this process could take up to 14 business days, but once approved, the patient will be contacted by our surgery scheduler to schedule the above procedure. Gave patient our surgery scheduler's information.    Informed patient to call our office regarding the status of their insurance authorization if it has been more than 3 weeks and have not heard from our surgery scheduler.    Patient is in agreement with all of the above and has no further questions at this time.    Thalia Rayo RN  [facility]  Vein Clinic

## 2024-11-18 NOTE — PROGRESS NOTES
Vein Clinic Consultation Note    HPI:  Nina Sommers is a 54 year old female who was seen today in consultation for right thigh and calf symptomatic varicose veins, refractory to compression stockings greater than 3 months.    The compression stockings have not helped very much which is understandable since her reflux and source of the varicosities is anterior accessory saphenous vein and the compression stockings do not address this area.    She still has itching and discomfort associated with the varicosities.  They have continued to progress over the last 3 years.      ROS    PHH:  No past medical history on file.     Past Surgical History:   Procedure Laterality Date    ABDOMEN SURGERY      Tummy tuck    breast lift      breast lift and tummy tuck    BREAST SURGERY      Breast lift    COLONOSCOPY N/A 2022    Procedure: COLONOSCOPY, WITH POLYPECTOMIES using cold snare and regular forceps;  Surgeon: Jennifer Atkinson MD;  Location:  GI       ALLERGIES:  Patient has no known allergies.    No Known Allergies    MEDS:    Current Outpatient Medications:     estradiol (CLIMARA) 0.0375 MG/24HR weekly patch, Place 1 patch onto the skin once a week, Disp: , Rfl:     scopolamine (TRANSDERM) 1 MG/3DAYS 72 hr patch, APPLY 1 PATCH TOPICALLY TO THE SKIN EVERY 72 HOURS, Disp: 10 patch, Rfl: 0    SOCIAL HABITS:    History   Smoking Status    Never   Smokeless Tobacco    Never     Social History    Substance and Sexual Activity      Alcohol use: Yes        Comment: rare      History   Drug Use No       FAMILY HISTORY:    Family History   Problem Relation Age of Onset    Myocardial Infarction Mother          from heart attack at age of 73    Diabetes Mother         type II    Family History Negative Father         macular degeneration    Breast Cancer Maternal Grandmother         in her 70's     Family History Negative Sister         2-doing well    Cancer - colorectal Other         paternal aunt,  from this  in her 60-70's-dad's sister    Colon Cancer No family hx of          Results for orders placed or performed in visit on 24   US Venous Competency Right    Narrative    Name:  Nina Sommers                                              Patient   ID: [id number]  Date: 2024                                          :   1970  Sex: female    Impression        Examined by: LIAT Dangelo RVT  Age:  54 year old                                                           Reading MD: LIONEL Price     INDICATION: varicose veins with pain     EXAM TYPE  RIGHT LOWER EXTREMITY VENOUS DUPLEX FOR VENOUS INSUFFICIENCY  TECHNICAL SUMMARY     A duplex ultrasound study using color flow was performed to evaluate the   right lower extremity veins for valvular incompetence with the patient in   a steep reversed Trendelenburg.      RIGHT:     The deep veins demonstrate phasic flow, compress, and respond to   augmentations.  There is no reflux or DVT.  The common femoral is   incompetent and free of thrombus. The remaining deep veins are competent   and free of thrombus.      The GSV demonstrates phasic flow, compresses, and responds to   augmentations from the saphenofemoral junction to the ankle with no   evidence of thrombus. The great saphenous vein measures 7.0 mm at the   saphenofemoral junction, 5.3 mm at the proximal thigh, and 3.8 mm at the   knee. The GSV is incompetent from SFJ to Mid Thigh, with the greatest   reflux time of 4.5 seconds.  The GSV gives rise to a varicose branch   measuring 3.0 mm off the Proximal Calf that courses Toward the ankle with   a reflux time of 2.1 seconds.     The AASV is incompetent (5.1 mm) draining into the saphenofemoral   junction. The AASV is incompetent from the saphenofemoral junction to the   proximal thigh with a reflux time of 4.6 seconds. The AASV takes a   straight course for 4 cm. The AASV gives rise to a varicose branch   measuring 8.6 mm off the Proximal Thigh that courses  Toward the ankle with   a reflux time of 4.6 seconds.     The Giacomini vein is competent (1.8 mm) communicating with the small   saphenous vein at the knee level.      The SSV demonstrates phasic flow, compresses, and responds to   augmentations from the popliteal space to the ankle.  No reflux or   thrombus is seen. The SPJ is not visualized.      Perforators: There is no evidence of incompetent  veins at any   level.     LEFT:     The CFV demonstrates phasic flow, compresses, and responds to   augmentations.  There is no DVT.       FINAL SUMMARY:    No DVT.  Competent right deep system  Incompetent right GSV with associated refluxing varicose veins.  Incompetent right AASV with associated refluxing varicose veins.     Incompetence Criteria: Greater than 0.5 seconds in the superficial and    veins and greater than 1.0 second in the deep veins.               VEIN CLINIC LEG DRAWING    Side:: Right    VCSS  PAIN:: 0  Varicose Veins:: 1  Venous Edema:: 0  Skin Pigmentation:: 0  Inflamation:: 0  Induration:: 0  Number of active ulcers:: 0  Active ulcer duration:: 0  Active ulcer diameter:: 0  Compression Therapy:: 2  VCSS Score:: 3    CEAP:  CEAP:: C2         ASSESSMENT: Symptomatic right thigh varicose veins associated with anterior accessory saphenous vein reflux.  Also concomitant greater saphenous vein reflux.  Refractory to conservative therapy with compression stockings greater than 3 months, this is in a territory that is not well treated by compression.  The right thigh varicose veins have progressive discomfort and itching, worse when standing for prolonged periods of time and requiring breaks to rest and elevate feet.    PLAN: Radiofrequency ablation of short anterior accessory saphenous vein on the right, may be technically challenging and requires sclerotherapy and phlebectomy of the outflow varicose veins of the right thigh at the same time.  Radiofrequency ablation of the secondarily  compromised right greater saphenous vein.      Janak Price MD    30 minutes spent today reviewing chart, discussing with patient and postvisit charting.

## 2024-12-12 DIAGNOSIS — I83.811 VARICOSE VEINS OF RIGHT LOWER EXTREMITY WITH PAIN: Primary | ICD-10-CM

## 2025-02-03 ENCOUNTER — APPOINTMENT (OUTPATIENT)
Age: 55
Setting detail: DERMATOLOGY
End: 2025-02-03

## 2025-02-03 DIAGNOSIS — L82.1 OTHER SEBORRHEIC KERATOSIS: ICD-10-CM

## 2025-02-03 DIAGNOSIS — L57.0 ACTINIC KERATOSIS: ICD-10-CM

## 2025-02-03 PROCEDURE — ? COUNSELING

## 2025-02-03 PROCEDURE — 17000 DESTRUCT PREMALG LESION: CPT

## 2025-02-03 PROCEDURE — ? LIQUID NITROGEN

## 2025-02-03 PROCEDURE — 17003 DESTRUCT PREMALG LES 2-14: CPT

## 2025-02-03 PROCEDURE — 99212 OFFICE O/P EST SF 10 MIN: CPT | Mod: 25

## 2025-02-03 ASSESSMENT — LOCATION SIMPLE DESCRIPTION DERM
LOCATION SIMPLE: RIGHT FOREHEAD
LOCATION SIMPLE: LEFT TEMPLE
LOCATION SIMPLE: LEFT FOREHEAD

## 2025-02-03 ASSESSMENT — LOCATION DETAILED DESCRIPTION DERM
LOCATION DETAILED: LEFT FOREHEAD
LOCATION DETAILED: RIGHT SUPERIOR FOREHEAD
LOCATION DETAILED: LEFT LATERAL TEMPLE

## 2025-02-03 ASSESSMENT — LOCATION ZONE DERM: LOCATION ZONE: FACE

## 2025-03-17 ENCOUNTER — TELEPHONE (OUTPATIENT)
Dept: VASCULAR SURGERY | Facility: CLINIC | Age: 55
End: 2025-03-17
Payer: COMMERCIAL

## 2025-03-17 DIAGNOSIS — I83.811 VARICOSE VEINS OF RIGHT LOWER EXTREMITY WITH PAIN: Primary | ICD-10-CM

## 2025-03-17 RX ORDER — CLONIDINE HYDROCHLORIDE 0.1 MG/1
TABLET ORAL
Qty: 1 TABLET | Refills: 0 | Status: SHIPPED | OUTPATIENT
Start: 2025-03-17

## 2025-03-17 RX ORDER — LORAZEPAM 1 MG/1
TABLET ORAL
Qty: 3 TABLET | Refills: 0 | Status: SHIPPED | OUTPATIENT
Start: 2025-03-17

## 2025-03-17 NOTE — TELEPHONE ENCOUNTER
3/17/2025    Vein Clinic Preoperative Nurse Call    Procedure: Right leg VNUS closure GSV, ASV(med nec), <10 stab phlebs(med nec), u/s sclero (med nec) 1/2 sessions (exp 3/31/25)  Date: Monday 3/24  Surgeon: Dr. Price  Time: 1400  Check in time: 1300    Called and spoke to patient. Informed patient: when to check in (1300) to sign consent, to bring their preop medications in their original bottle with them (3mg ativan, 0.1mg clonidine). Patient will take the medications after signing the consent to the procedure. Instructed patient to wear loose-fitting comfortable clothing, and bring their compression hose. Ensured patient has a /someone that will be responsible for them the rest of the day. Once procedure is completed, we will keep patient in recovery for 30-45 mins, and call  with aftercare instructions. Informed patient, that if possible, they should sit in the backseat to elevate their leg on the ride home.    Pt needs Thigh High compression hose for procedure. Status of the hose: patient has thigh high and pantyhose.    Patient understands if they have any of the following symptoms (fever, cough, shortness of breath, rash), they need to notify us immediately as they may need to cancel their procedure and reschedule for a later date.    Patient is in agreement with all of the above and has no further questions at this time.    Elvira Samuels RN  [facility]

## 2025-03-21 NOTE — PATIENT INSTRUCTIONS
Post-Procedure Instructions:             VNUS Closure and Phlebectomies      Post-Op Day Zero - The Day of Your Procedure:0  1. Medication for Pain Control and Inflammation Control   - The numbing medication injected during your procedure will last for several hours. The pre-procedure                 tablets may make you very sleepy and you might not remember everything from the procedure or from                 the day. This will usually wear off by the next day.   - Ibuprofen:  If tolerated, take ibuprofen (e.g., Advil) to reduce inflammation whether or not you have                 pain. For three days, take two tablets (200mg each) with every meal and at bedtime with a snack. If                 your pain is not controlled with ibuprofen, you may take prescription pain medication (such as Norco),                 if prescribed.   - You may resume taking any medications you were taking before your procedure.  2. Activity   - Rest with your leg(s) elevated above your heart. This will prevent from a lot of swelling and                 bleeding. You do not need to elevate your leg(s) while sleeping at night. You may go upstairs, sit up to                 eat, use the bathroom, and take several five minute walks. Otherwise, keep your leg(s) elevated.                 Minimize the amount of time you are up on your feet to about 30 minutes at a time.  3. Bandages   - The incision sites will be covered with soft bandages and an ACE wrap. Keep your bandages on and    dry for 48 hours. The ACE should provide  snug  compression, but should not cause pain or numbness    in the toes. If you have significant discomfort or your toes become cold or numb, unwrap your ACE and    rewrap with less tension starting at the toes wrapping upward.  4. Incisions   - Bleeding: You may see some incision sites that are oozing through the bandages. This is not unusual    and can be managed with Rest, Ice, Compression and Elevation (RICE). Apply  ice and firm pressure    directly to the site that is bleeding and rest with your leg(s) elevated above your heart for 20-30 minutes.    Post-Op Day One:  1. Medication   - Ibuprofen:  Continue the same as the Day of Your Procedure. If your pain is not controlled with    ibuprofen, you may take prescription pain medication (such as Norco), if prescribed.  2. Activity   - We would like you to get up at least six times and walk around for short periods of time, unless it is    causing you pain. You should not be on your feet more than 90 minutes at a time. Elevate your leg    above your heart when you are not walking.  3. Bandages   - Your bandages must be kept on and dry for 48 hours.  4. Driving   - You may resume driving when you can do so safely. Do not drive if you are taking narcotic pain    medication.    Post-Op Day Two:   1. Medication  - Ibuprofen: Continue the same as the Day of Your Procedure.  2.  Activity   - Walk as tolerated. Elevate as much as possible when not walking.  3. Bandages and Compression  - Remove ACE wrap and padding. Shower and put on your compression hose during waking hours only for at least 5 days. (Your doctor may instruct you to keep your bandages on until your return appointment; please follow your doctor's instructions.)  4. Incisions   - Your leg(s) will be bruised; there may be swelling, hard knots under the skin and possibly some    numbness. These will likely resolve over time. If you see  hair-like  strings coming out of your    incisions, do not pull them (this will only cause pain/discomfort). We will trim them when you come   back for your follow-up appointment.  5. Call Us If:   - You see any areas on your leg that are red and angry in appearance.   - You notice any drainage that is milky or cloudy in appearance or that has a foul odor.   - You run a temperature of 100.5 or greater.    Post-Op Day Three:  You will have a follow up appointment 2-4 days post-procedure. At  this appointment, you will have an ultrasound and we will check your incisions.    _______________________________________________________________________________________________    The Two Weeks Following Your Procedure  1.  Skin Care   - Do not use any lotions, creams or powders on your leg for 14 days or until the incisions have healed.   - Do not soak in a bathtub, whirlpool, or hot tub or go swimming for 14 days or until your incisions have  healed.  2.  Medications   - You may use ibuprofen or acetaminophen (e.g., Tylenol) as needed for pain or discomfort.  3.  Activity   - Do not lift over 25 pounds. After about two weeks you may resume exercise such as aerobics, running,    tennis or weight lifting. Use your common sense and ease back into your exercise routine slowly.   - You may feel a cord-like tightness along the inside of your leg. Gentle stretching can be helpful.  4. Compression Hose   - Your doctor may instruct you to wear compression for longer than seven days; please    follow your doctor's instructions. As a comfort measure, you may choose to wear compression for    longer than required.  5.  Travel   - Do not fly in an airplane for 14 days after your procedure. If you have a long car trip planned within    two to three weeks following your procedure, stop and walk for a few minutes every two hours.    Periodic ankle pumps during the ride may be helpful.    Six Week Appointment   At your six week appointment, you will see your surgeon for an exam and evaluation. This office visit    will be scheduled when you return for Post-op Day Three Return Appointment.     Return to Work  1.  If you work outside the home, you may return to work in a few days depending on the extent of your procedure, how you tolerate it, and the type of work you perform.  2.  Paperwork: If your employer requires paperwork or you would like a letter written to your employer, please let us know. We will complete disability type  forms at no charge. Please allow five business days for forms to be completed.     SCLEROTHERAPY AFTER CARE  Immediately:  After treatment, walk for 10-15 minutes before getting in your car.  If your trip home is more than 1 hour, stop and walk around for 5-10 minutes. Avoid sitting or standing for extended periods.   First 24 hours: Wear your compression continuously, even while in bed. After the 24 hours, you may shower if you want to. Put your hose back on, unless you are going to bed. You should NOT wear compression to bed after the first 24 hours. You may fly the next day, but wear your compression.   For 5 days: Wear the compression hose for waking hours only. You may continue to wear them longer than 5 days if you prefer.   For days 5-7: Walking is encouraged, as it promotes efficient circulation in your veins. You may do activities that raise your heart rate, but do NOT run, jog, do high impact aerobics, or weight lifting. After 7 days, no activity restrictions.    Shaving: Wait a few days to shave or apply lotion.   Bathing: Do NOT take hot baths or sit in a hot tub for 7-10 days.    For 1 year: Wear SPF 30 sunscreen on your legs when in the sun. This is very important! It helps prevent darkening of the skin at the injection sites.   Medications: You may resume your usual medications, including aspirin or ibuprofen.    Common Things to Expect  -  Compression must be worn for the first 24 hours and then during the day for 5-7 days.    -  If larger veins are treated with ultrasound-guided sclerotherapy, you will have redness, firmness, tenderness, and swelling.  This firmness and tenderness may take 3-6 months to resolve. Ibuprofen and compression hose will aid in this process.    -  You will have bruising that can last up to 3 weeks. Most fading of the veins will occur between 3 and 6 weeks after treatment.    -  You may notice brown discoloration (hyperpigmentation) at the treatment site.  This should fade  with time, but will take 3 months to 1 year to fully heal.     -  Some treated veins may look darker because of trapped blood within the vein. This trapped blood can be removed at a minimum of 1 month following treatment. Larger veins are more likely to develop trapped blood.    -  It is very important for you to use at least SPF 30 sunscreen in order to help prevent the discoloration of your skin.    -  Migraines rarely occur following sclerotherapy, but are more likely in patients with a history of migraines.  Treat as you would any other migraine.

## 2025-03-24 ENCOUNTER — OFFICE VISIT (OUTPATIENT)
Dept: VASCULAR SURGERY | Facility: CLINIC | Age: 55
End: 2025-03-24
Payer: COMMERCIAL

## 2025-03-24 VITALS — HEART RATE: 70 BPM | SYSTOLIC BLOOD PRESSURE: 112 MMHG | DIASTOLIC BLOOD PRESSURE: 68 MMHG | OXYGEN SATURATION: 98 %

## 2025-03-24 DIAGNOSIS — I83.811 VARICOSE VEINS OF RIGHT LOWER EXTREMITY WITH PAIN: Primary | ICD-10-CM

## 2025-03-24 NOTE — PROGRESS NOTES
Vein Clinic Procedure Note    Preoperative diagnosis:    1.  Right anterior accessory saphenous vein reflux  2.  Right greater saphenous vein reflux  3.  Right anterior thigh and calf varicose vein reflux    Post operative diagnosis:  Same    Procedure:  1.  Attempted ablation of the right anterior accessory saphenous vein, wire and sheath placed but due to spasm of the 3 cm catheter could not be passed intraluminally.  2.  Radiofrequency ablation of the proximal right greater saphenous vein  3.  Sclerotherapy of the distal right greater saphenous vein  4.  Sclerotherapy of anterior thigh and lateral calf varicosities  5.  Microphlebectomy of right thigh and calf varicosities.    Preoperative medications: 3 mg ativan, 0.1 mg clonidine      Procedures    Operative description  Indications: Symptomatic varicose veins, venous reflux.  Informed consent obtained.  Patient mapped and marked.  Sterile prep as per protocol.    Using ultrasound guidance, I mapped the right anterior accessory saphenous vein which was short and superficial.  We accessed this purposely through the varicose vein trunk in order to disrupt outflow.  We were able to pass a wire with ultrasound guidance into the anterior accessory saphenous vein but when trying to pass the sheath, the vessel spasmed and we could not pass the 3 cm radiofrequency catheter intraluminally.  The anterior accessory saphenous vein closed well from the instrumentation with visible clot and small hematoma.    We injected downstream 1% foamed polidocanol into the varicose vein trunks of the thigh and calf.    We then proceeded to map the right greater saphenous vein which had spasmed significantly.  We chose a segment in the upper calf to access.  We used the 3 cm radiofrequency catheter and placed it approximately 3 cm from the saphenofemoral junction.  We used tumescent anesthesia to create a halo and placed the patient feet up position.  We then did 3 treatments in the  proximal segment, 2 segments in the next 2 segments and 1 treatment per segment thereafter treating the right GSV from the saphenofemoral junction down to the lower thigh.  We injected 1% foamed polidocanol through the sheath using the dilator and confirmed closure of the right GSV.    We then used additional tumescent anesthesia to lift the marked superficial varicosities from the underlying structures and performed microphlebectomy as per protocol.    Stab phlebectomy: 18    EBL: 1 mL        3/24/2025     2:29 PM   Flowsheet Data   Procedure Start Time: 14:29   Prep: Chloraprep   Side: Right   Tx Length (cm): RIGHT GSV: 52   Junction (cm): RIGHT GSV: 3.5   RF Cycles: RIGHT GSV: 20   RF TX Time (Minutes): RIGHT GSV: 6:40   # PHLEB Sites: RIGHT LE   Sedation taken: Yes   Pre Pt. Physical / Cognitive Limitations: WNL   TOTAL Local anesthesia Injected (ml): 10.5   Max Volume Local Anesthesia (ml): 11   TOTAL Tumescent Injected volume (ml): 472   Max Volume Tumescent (ml): 572   Post Pt. Physical / Cognitive Limitations: WNL   Procedure End Time: 15:34   D/C Instructions given, states readiness to leave and escorted to car: Yes     Tumescent Concentration: Total Volume: 572ml - 0.9% Sodium Chloride 500ml Bag +  Lidocaine 1% with Epinephrine 1:100,000: 60ml + 8.4% Sodium Bicarbonate: 12ml    Patient's blood pressure, pulse and pulse oximetry were continuously monitored throughout the procedure under my direct supervision and was stable during the procedure.    Patient recovered in our suites and discharged home with their family with postoperative instructions and follow up.     Janak Prcie MD

## 2025-03-24 NOTE — PROGRESS NOTES
Pre-procedure Nursing Note    Nina Sommers presents to clinic for Vein Procedure  .   /Person Responsible for Patient: Jim Sommers (Spouse)  Phone Number: [phone]    Prophylactic Medication:N/A   Sedation Medication: Ativan, 3mg ,   Time Taken: 12:45 pm and Clonidine, 0.1mg,   Time Taken: 12:45 pm  Compression Stockings: Patient brought with today.  The procedure is being performed on RLE.  Patient understanding of procedure matches consent? YES    Patient's pre-procedure medications verified by Jenni VALLEJO MA.    Jenni Smith on 3/24/2025 at 12:53 PM

## 2025-03-24 NOTE — LETTER
3/24/2025      Nina Sommers  [address] [place] [address]      Dear Colleague,    Thank you for referring your patient, Nina Sommers, to the [facility] [place]. Please see a copy of my visit note below.    Pre-procedure Nursing Note    Nina Sommers presents to clinic for Vein Procedure  .   /Person Responsible for Patient: Jim Sommers (Spouse)  Phone Number: [phone]    Prophylactic Medication:N/A   Sedation Medication: Ativan, 3mg ,   Time Taken: 12:45 pm and Clonidine, 0.1mg,   Time Taken: 12:45 pm  Compression Stockings: Patient brought with today.  The procedure is being performed on E.  Patient understanding of procedure matches consent? YES    Patient's pre-procedure medications verified by Jenni VALLEJO MA.    Jenni Smith on 3/24/2025 at 12:53 PM        Vein Clinic Procedure Note    Preoperative diagnosis:    1.  Right anterior accessory saphenous vein reflux  2.  Right greater saphenous vein reflux  3.  Right anterior thigh and calf varicose vein reflux    Post operative diagnosis:  Same    Procedure:  1.  Attempted ablation of the right anterior accessory saphenous vein, wire and sheath placed but due to spasm of the 3 cm catheter could not be passed intraluminally.  2.  Radiofrequency ablation of the proximal right greater saphenous vein  3.  Sclerotherapy of the distal right greater saphenous vein  4.  Sclerotherapy of anterior thigh and lateral calf varicosities  5.  Microphlebectomy of right thigh and calf varicosities.    Preoperative medications: 3 mg ativan, 0.1 mg clonidine      Procedures    Operative description  Indications: Symptomatic varicose veins, venous reflux.  Informed consent obtained.  Patient mapped and marked.  Sterile prep as per protocol.    Using ultrasound guidance, I mapped the right anterior accessory saphenous vein which was short and superficial.  We accessed this purposely through the varicose vein trunk in order to disrupt  outflow.  We were able to pass a wire with ultrasound guidance into the anterior accessory saphenous vein but when trying to pass the sheath, the vessel spasmed and we could not pass the 3 cm radiofrequency catheter intraluminally.  The anterior accessory saphenous vein closed well from the instrumentation with visible clot and small hematoma.    We injected downstream 1% foamed polidocanol into the varicose vein trunks of the thigh and calf.    We then proceeded to map the right greater saphenous vein which had spasmed significantly.  We chose a segment in the upper calf to access.  We used the 3 cm radiofrequency catheter and placed it approximately 3 cm from the saphenofemoral junction.  We used tumescent anesthesia to create a halo and placed the patient feet up position.  We then did 3 treatments in the proximal segment, 2 segments in the next 2 segments and 1 treatment per segment thereafter treating the right GSV from the saphenofemoral junction down to the lower thigh.  We injected 1% foamed polidocanol through the sheath using the dilator and confirmed closure of the right GSV.    We then used additional tumescent anesthesia to lift the marked superficial varicosities from the underlying structures and performed microphlebectomy as per protocol.    Stab phlebectomy: 18    EBL: 1 mL        3/24/2025     2:29 PM   Flowsheet Data   Procedure Start Time: 14:29   Prep: Chloraprep   Side: Right   Tx Length (cm): RIGHT GSV: 52   Junction (cm): RIGHT GSV: 3.5   RF Cycles: RIGHT GSV: 20   RF TX Time (Minutes): RIGHT GSV: 6:40   # PHLEB Sites: RIGHT LE   Sedation taken: Yes   Pre Pt. Physical / Cognitive Limitations: WNL   TOTAL Local anesthesia Injected (ml): 10.5   Max Volume Local Anesthesia (ml): 11   TOTAL Tumescent Injected volume (ml): 472   Max Volume Tumescent (ml): 572   Post Pt. Physical / Cognitive Limitations: WNL   Procedure End Time: 15:34   D/C Instructions given, states readiness to leave and  escorted to car: Yes     Tumescent Concentration: Total Volume: 572ml - 0.9% Sodium Chloride 500ml Bag +  Lidocaine 1% with Epinephrine 1:100,000: 60ml + 8.4% Sodium Bicarbonate: 12ml    Patient's blood pressure, pulse and pulse oximetry were continuously monitored throughout the procedure under my direct supervision and was stable during the procedure.    Patient recovered in our suites and discharged home with their family with postoperative instructions and follow up.     Janak Price MD      Again, thank you for allowing me to participate in the care of your patient.        Sincerely,        Janak Price MD    Electronically signed

## 2025-03-26 ENCOUNTER — ANCILLARY PROCEDURE (OUTPATIENT)
Dept: ULTRASOUND IMAGING | Facility: CLINIC | Age: 55
End: 2025-03-26
Attending: INTERNAL MEDICINE
Payer: COMMERCIAL

## 2025-03-26 ENCOUNTER — ALLIED HEALTH/NURSE VISIT (OUTPATIENT)
Dept: VASCULAR SURGERY | Facility: CLINIC | Age: 55
End: 2025-03-26
Attending: INTERNAL MEDICINE
Payer: COMMERCIAL

## 2025-03-26 DIAGNOSIS — I83.811 VARICOSE VEINS OF RIGHT LOWER EXTREMITY WITH PAIN: ICD-10-CM

## 2025-03-26 DIAGNOSIS — Z09 POSTOP CHECK: Primary | ICD-10-CM

## 2025-03-26 PROCEDURE — 93971 EXTREMITY STUDY: CPT | Mod: RT | Performed by: INTERNAL MEDICINE

## 2025-03-26 NOTE — PATIENT INSTRUCTIONS
Vein Closure (Ablation)  Common Things to Expect    A small lump may develop beneath the site(s) where the vein closure device was inserted. This is a normal step in healing. These should not be painful, but may be tender to the touch. It can take 6 weeks to 3 months for the lumps/firmness to resolve.   Bruising will look worse before it looks better and can last for 4-6 weeks.  After about 10 days, you may notice tightness/pulling on the inside thigh and knee. As your ablated vein is healing, it contracts, causing a tightness or pulling sensation. This may last for several weeks, but will resolve. Treat it with Ibuprofen or Advil.  Numbness will get better with time, but may take 3 months to a year to resolve.  You may notice that the skin on your legs has become ultra-sensitive to touch. For example, the weight of your sheets may feel painful. This usually resolves in 6 weeks.  Ankle swelling is not uncommon and may last 4-6 weeks.   To get optimal results from your procedure, wearing your compression hose is key for the first 7 days. This is necessary to ensure proper closure of the ablated vein.  For 2 weeks, no weight lifting over 25lbs, no running, and no vigorous aerobic exercise. After this time, ease back into your normal activities. If you do too much too soon, you will have more pain and bruising and possibly re-open the vein that was closed. It takes about 2 weeks for the ablated vein to permanently close. Keep in mind your body is still healing.  For 2 weeks, do not shave your legs or use lotions, powders, creams to allow proper healing of phlebectomy sites and vein access sites.      Vein Removal (Phlebectomy)  Common Things to Expect     Small lumps may develop beneath phlebectomy sites. This is a normal step in healing.  These should not be painful, but may be tender to the touch. It can take 6 weeks to 3 months for these lumps/firmness to resolve. About 3-4 weeks after your phlebectomies, when the  bruising has subsided and the incisions are healed, gentle massage will aid in the healing of these small lumps.  Bruising will look worse before it looks better and can last for 4-6 weeks.  Ankle swelling is not uncommon and may last 4-6 weeks.       If you are experiencing any of the following symptoms, please seek immediate medical attention at your local emergency department.  - Significant pain in the back of the calf possibly with difficulty walking  - Significant swelling and/or tenderness in the back of the calf  - Redness that continues to spread  - Chest pain and/or shortness of breath   SCLEROTHERAPY AFTER CARE  Immediately:  After treatment, walk for 10-15 minutes before getting in your car.  If your trip home is more than 1 hour, stop and walk around for 5-10 minutes. Avoid sitting or standing for extended periods.   First 24 hours: Wear your compression continuously, even while in bed. After the 24 hours, you may shower if you want to. Put your hose back on, unless you are going to bed. You should NOT wear compression to bed after the first 24 hours. You may fly the next day, but wear your compression.   For 5 days: Wear the compression hose for waking hours only. You may continue to wear them longer than 5 days if you prefer.   For days 5-7: Walking is encouraged, as it promotes efficient circulation in your veins. You may do activities that raise your heart rate, but do NOT run, jog, do high impact aerobics, or weight lifting. After 7 days, no activity restrictions.    Shaving: Wait a few days to shave or apply lotion.   Bathing: Do NOT take hot baths or sit in a hot tub for 7-10 days.    For 1 year: Wear SPF 30 sunscreen on your legs when in the sun. This is very important! It helps prevent darkening of the skin at the injection sites.   Medications: You may resume your usual medications, including aspirin or ibuprofen.    Common Things to Expect  -  Compression must be worn for the first 24 hours  and then during the day for 5-7 days.    -  If larger veins are treated with ultrasound-guided sclerotherapy, you will have redness, firmness, tenderness, and swelling.  This firmness and tenderness may take 3-6 months to resolve. Ibuprofen and compression hose will aid in this process.    -  You will have bruising that can last up to 3 weeks. Most fading of the veins will occur between 3 and 6 weeks after treatment.    -  You may notice brown discoloration (hyperpigmentation) at the treatment site.  This should fade with time, but will take 3 months to 1 year to fully heal.     -  Some treated veins may look darker because of trapped blood within the vein. This trapped blood can be removed at a minimum of 1 month following treatment. Larger veins are more likely to develop trapped blood.    -  It is very important for you to use at least SPF 30 sunscreen in order to help prevent the discoloration of your skin.    -  Migraines rarely occur following sclerotherapy, but are more likely in patients with a history of migraines.  Treat as you would any other migraine.

## 2025-03-26 NOTE — PROGRESS NOTES
March 26, 2025    Vein Clinic Postoperative Nurse Note    Patient is here for her 48 hour postoperative visit.    Procedure: Right leg VNUS closure GSV, **ASV**(med nec), <10 stab phlebs(med nec), u/s sclero (med nec)  Procedure Date: 3/24/25  Surgeon: Dr. Price    Ultrasound Result: The GSV is closed  flush with the SFJ to the knee with evidence of thrombus seen throughout.     The AASV is closed 17.1 mm from the SFJ to the proximal thigh with evidence of thrombus seen throughout.    Physical Exam: Incisions are approximated without signs of infection.  Ecchymosis: Moderate bruising especially noted to the right anterior thigh  Swelling: Minimal  Paresthesia: Patient denies numbness to the right lower extremity    Patient Questions or Concerns: Patient was instructed to take 325 mg ASA daily for the next 7 days. Encouraged patient to be active. Per EHIT1 protocol will follow up with patient in 6 weeks.    Patient was able to independently don pantyhose after today's visit.     Reviewed postoperative instructions with patient and provided her with written material of common things to expect from her procedure.    Patient's Next Vein Clinic Appointment: 6 week follow up with Dr. Price 5/12/25    Thalia Rayo RN  [facility]

## 2025-04-19 ENCOUNTER — HEALTH MAINTENANCE LETTER (OUTPATIENT)
Age: 55
End: 2025-04-19

## 2025-05-12 ENCOUNTER — OFFICE VISIT (OUTPATIENT)
Dept: VASCULAR SURGERY | Facility: CLINIC | Age: 55
End: 2025-05-12
Payer: COMMERCIAL

## 2025-05-12 DIAGNOSIS — I83.811 VARICOSE VEINS OF RIGHT LOWER EXTREMITY WITH PAIN: Primary | ICD-10-CM

## 2025-05-12 DIAGNOSIS — I83.812 VARICOSE VEINS OF LEG WITH PAIN, LEFT: ICD-10-CM

## 2025-05-12 RX ORDER — VALACYCLOVIR HYDROCHLORIDE 1 G/1
1000 TABLET, FILM COATED ORAL
COMMUNITY
Start: 2024-07-31

## 2025-05-12 NOTE — PROGRESS NOTES
6-week follow-up from 3/20/2025    Preoperative diagnosis:    1.  Right anterior accessory saphenous vein reflux  2.  Right greater saphenous vein reflux  3.  Right anterior thigh and calf varicose vein reflux     Post operative diagnosis:  Same     Procedure:  1.  Attempted ablation of the right anterior accessory saphenous vein, wire and sheath placed but due to spasm of the 3 cm catheter could not be passed intraluminally.  2.  Radiofrequency ablation of the proximal right greater saphenous vein  3.  Sclerotherapy of the distal right greater saphenous vein  4.  Sclerotherapy of anterior thigh and lateral calf varicosities  5.  Microphlebectomy of right thigh and calf varicosities.       Follow-up ultrasound 3/26/2025  Comments: The Right CFV demonstrates phasic flow, compresses and responds to augmentations.  No evidence of DVT at this time.  The remainder of deep veins including femoral, popliteal, posterior tibial and peroneal veins are widely patent and fully compressible with no evidence for DVT at this time.     The GSV is closed  flush with the SFJ to the knee with evidence of thrombus seen throughout.     The AASV is closed 17.1 mm from the SFJ to the proximal thigh with evidence of thrombus seen throughout.         Impression: Successful closure of the right GSV and right AASV.      Patient states that her right leg feels wonderful.  It feels so good that she is noticing some left lower extremity achiness and heaviness worse than the right leg.  Plan venous competency testing of the left lower extremity and follow-up video visit with me afterwards.    30-minute spent today reviewing chart, discussing with patient, coordinating care and postvisit charting.  Reviewing her imaging from previous.

## 2025-05-12 NOTE — LETTER
5/12/2025      Nina Sommers  [address] [place] [address]      Dear Colleague,    Thank you for referring your patient, Nina Sommers, to the [facility] [place]. Please see a copy of my visit note below.    6-week follow-up from 3/20/2025    Preoperative diagnosis:    1.  Right anterior accessory saphenous vein reflux  2.  Right greater saphenous vein reflux  3.  Right anterior thigh and calf varicose vein reflux     Post operative diagnosis:  Same     Procedure:  1.  Attempted ablation of the right anterior accessory saphenous vein, wire and sheath placed but due to spasm of the 3 cm catheter could not be passed intraluminally.  2.  Radiofrequency ablation of the proximal right greater saphenous vein  3.  Sclerotherapy of the distal right greater saphenous vein  4.  Sclerotherapy of anterior thigh and lateral calf varicosities  5.  Microphlebectomy of right thigh and calf varicosities.       Follow-up ultrasound 3/26/2025  Comments: The Right CFV demonstrates phasic flow, compresses and responds to augmentations.  No evidence of DVT at this time.  The remainder of deep veins including femoral, popliteal, posterior tibial and peroneal veins are widely patent and fully compressible with no evidence for DVT at this time.     The GSV is closed  flush with the SFJ to the knee with evidence of thrombus seen throughout.     The AASV is closed 17.1 mm from the SFJ to the proximal thigh with evidence of thrombus seen throughout.         Impression: Successful closure of the right GSV and right AASV.      Patient states that her right leg feels wonderful.  It feels so good that she is noticing some left lower extremity achiness and heaviness worse than the right leg.  Plan venous competency testing of the left lower extremity and follow-up video visit with me afterwards.    30-minute spent today reviewing chart, discussing with patient, coordinating care and postvisit charting.  Reviewing  her imaging from previous.    Again, thank you for allowing me to participate in the care of your patient.        Sincerely,        Janak Price MD    Electronically signed

## 2025-06-02 ENCOUNTER — ANCILLARY PROCEDURE (OUTPATIENT)
Dept: ULTRASOUND IMAGING | Facility: CLINIC | Age: 55
End: 2025-06-02
Attending: INTERNAL MEDICINE
Payer: COMMERCIAL

## 2025-06-02 DIAGNOSIS — I83.812 VARICOSE VEINS OF LEG WITH PAIN, LEFT: ICD-10-CM

## 2025-06-02 PROCEDURE — 93971 EXTREMITY STUDY: CPT | Mod: LT | Performed by: INTERNAL MEDICINE

## 2025-06-03 ENCOUNTER — RESULTS FOLLOW-UP (OUTPATIENT)
Dept: CARDIOLOGY | Facility: CLINIC | Age: 55
End: 2025-06-03

## 2025-06-16 ENCOUNTER — VIRTUAL VISIT (OUTPATIENT)
Dept: VASCULAR SURGERY | Facility: CLINIC | Age: 55
End: 2025-06-16
Attending: INTERNAL MEDICINE
Payer: COMMERCIAL

## 2025-06-16 DIAGNOSIS — I83.812 VARICOSE VEINS OF LEG WITH PAIN, LEFT: Primary | ICD-10-CM

## 2025-06-16 PROCEDURE — 98005 SYNCH AUDIO-VIDEO EST LOW 20: CPT | Performed by: INTERNAL MEDICINE

## 2025-06-16 PROCEDURE — G2211 COMPLEX E/M VISIT ADD ON: HCPCS | Mod: 95 | Performed by: INTERNAL MEDICINE

## 2025-06-16 NOTE — PATIENT INSTRUCTIONS
Jenni Smith, Surgery Scheduler - [phone].    Procedure Plan: 1. Left leg VNUS closure ASV, <10 Phlebectomies (medically necessary), and Ultrasound Guided Sclerotherapy (medically necessary)      Please call our office regarding the status of your insurance authorization if it has been more than 3 weeks and you have not heard from our surgery scheduler.         Pre-Procedure Instructions:                           VNUS Closure and Phlebectomies   You are having a Closure(s) - where one or more veins are closed and Phlebectomies - where one or more veins are removed.   Insurance  Precertification and/or referral authorization may be required by your insurance company.  We will call your insurance company to verify benefits for the medically necessary part of your procedure.    Your Current Medications and Allergies  To reduce bruising, please do not take aspirin-type medications (Motrin, Aleve, Ibuprofen, Advil, etc.) for three days before your procedure. You may take Tylenol if you need a pain reliever.  Are you on blood thinner medications? (Plavix, Coumadin, Eliquis, Xarelto) Please discuss this with your surgeon. You may resume taking your blood thinner medication after your procedure.  Are you sensitive to latex or adhesives used for fake fingernails? Please let us know!    Driving Escort and   Please arrange to have a trusted adult (18 years old or older) drive you to and from the clinic.  For your safety, we recommend you have a trusted adult to stay with you until the next morning.    Your Health  If you have a change in your health before the procedure, contact our office immediately. (For example: cold symptoms, cough, urinary tract infection, fever, flu symptoms.)  A pre-procedure physical is not required.    Note  It is sometimes necessary to adjust the procedure schedule due to emergencies. We greatly appreciate your flexibility and understanding in this matter.                  Check  List: The Morning of Your Procedure  ___1. Please do not put anything on your leg(s) or shave the day of your procedure.  ___2. You may take your normal medications the day of your procedure.  ___3. It is recommended you eat a light breakfast or lunch the day of your procedure.  ___4. Wear comfortable loose-fitting clothing and wide-fitting shoes (i.e. tennis shoes, slip-ons).  ___5. Please arrive at our clinic at the specified time given by the nurse.  ___6. You will sign an affirmation of informed consent.  ___7. Bring your pre-procedure sedation medication (lorazepam and clonidine) with you to the clinic.              One hour before your procedure, you will be instructed to take these medications. The lorazepam              (Ativan) lowers anxiety and sedates you; the clonidine makes the lorazepam more effective. Everyone's              body processes these medications differently. Therefore, reactions to these medications vary. Some              people stay awake and some people sleep through the whole procedure. You may not remember              everything about the procedure or the day. You do not want to make any big decisions for the rest of the              day.    The Day of Your Procedure:       VNUS Closure and Phlebectomies  In the Exam Room  A nurse will bring you back to an exam room with your family member or friend. This is when your informed consent will be signed, and you will take your pre-procedure medications.  You will be asked to remove everything from the waist down, including undergarments. You will then put on a hospital gown or shorts and blue booties.  Your surgeon will come in to answer any questions and amanda any bulging varicose veins to be removed.  You will be taken to the restroom to empty your bladder before going into the procedure room.    In the Procedure Room  You will be escorted to the procedure room. You will lie on a procedure table covered with a sheet or blanket.  A  nurse will put a blood pressure cuff on your arm and a pulse/oxygen monitor on a finger. Your vital signs will be monitored every 15 minutes.  Your gown will be pulled up slightly and the groin exposed for a short period of time. The surgeon's assistant will clean your foot, leg, and groin with an antibacterial solution. We will get you covered up as quickly as possible!  Sterile towels and blue drapes will be used to cover you and the table. You will be asked to keep your hands under the blue drapes during the procedure.  The lights will be turned down. The table will be tipped so your head is higher than your feet. You may feel like you're going to slide off, but you won't.    The Procedure  The surgeon will visualize your veins with an ultrasound machine. He or she will then numb your skin and access the vein. A catheter is passed up the vein and positioned with ultrasound guidance. The table will then be tipped head down.  Once the catheter is in the correct position, medication will be injected to numb your leg. You will feel some needle sticks and may feel discomfort as the medication goes in. Once this is done, you should not experience significant discomfort. But if you do, please let us know and more numbing medication can be injected. As the catheter sends out heat, the vein closes off and the catheter is withdrawn.  For the phlebectomy part of the procedure, small incisions are made where the bulging varicose veins have been marked on your leg(s) and these veins will be removed using a small tish hook instrument.    Post-Procedure  Once the procedure is done, your leg(s) will be washed with warm water and dried. Your leg(s) will be bandaged with large soft dressings and a large ACE bandage wrapped from toes to groin.   You will be offered something to drink and a light snack.  You will rest with your leg(s) elevated for approximately 30 minutes. Your friend or family member may join you.  For your  safety, you will be taken to your car in a wheelchair. If you are able to, it is good to keep your leg(s) elevated on the car ride home.    Post-Procedure Instructions:             VNUS Closure and Phlebectomies      Post-Op Day Zero - The Day of Your Procedure:0  1. Medication for Pain Control and Inflammation Control   - The numbing medication injected during your procedure will last for several hours. The pre-procedure                 tablets may make you very sleepy and you might not remember everything from the procedure or from                 the day. This will usually wear off by the next day.   - Ibuprofen:  If tolerated, take ibuprofen (e.g., Advil) to reduce inflammation whether or not you have                 pain. For three days, take two tablets (200 mg each) with every meal and at bedtime with a snack. If                 your pain is not controlled with ibuprofen, you may take prescription pain medication (such as Norco),                 if prescribed.   - You may resume taking any medications you were taking before your procedure.  2. Activity   - Rest with your leg(s) elevated above your heart. This will prevent from a lot of swelling and                 bleeding. You do not need to elevate your leg(s) while sleeping at night. You may go upstairs, sit up to                 eat, use the bathroom, and take several five-minute walks. Otherwise, keep your leg(s) elevated.                 Minimize the amount of time you are up on your feet to about 30 minutes at a time.  3. Bandages   - The incision sites will be covered with soft bandages and an ACE wrap. Keep your bandages on                 and dry for 48 hours. The ACE should provide  snug  compression but should not cause pain or                 numbness in the toes. If you have significant discomfort or your toes become cold or numb, unwrap                 your ACE and rewrap with less tension starting at the toes wrapping upward.  4. Incisions   -  Bleeding: You may see some incision sites that are oozing through the bandages. This is not unusual      and can be managed with Rest, Ice, Compression and Elevation (RICE). Apply ice and firm pressure      directly to the site that is bleeding and rest with your leg(s) elevated above your heart for 20-30                 minutes.    Post-Op Day One:  1. Medication   - Ibuprofen: Continue the same as the Day of Your Procedure. If your pain is not controlled with                 ibuprofen, you may take prescription pain medication (such as Norco), if prescribed.  2. Activity   - We would like you to get up at least six times and walk around for short periods of time, unless it is      causing you pain. You should not be on your feet more than 90 minutes at a time. Elevate your leg      above your heart when you are not walking.  3. Bandages   - Your bandages must be kept on and dry for 48 hours..  4. Driving   - You may resume driving when you can do so safely. Do not drive if you are taking narcotic pain      medication.  Post-Op Day Two:   1. Medication  - Ibuprofen: Continue the same as the Day of Your Procedure.  2.  Activity  - Walk as tolerated. Elevate as much as possible when not walking.  3. Bandages and Compression  - Remove ACE wrap and padding. Shower and put on your compression hose during waking hours only       for at least 5 days. (Your doctor may instruct you to keep your bandages on until your return     appointment; please follow your doctor's instructions.)  4. Incisions  - Your leg(s) will be bruised; there may be swelling, hard knots under the skin and possibly some     numbness. These will likely resolve over time. If you see  hair-like  strings coming out of your     incisions, do not pull them (this will only cause pain/discomfort). We will trim them when you come     back for your follow-up appointment.  5. Call Us If:   - You see any areas on your leg that are red and angry in  appearance.   - You notice any drainage that is milky or cloudy in appearance or that has a foul odor.   - You run a temperature of 100.5 or greater.    Post-Op Day Three:  You will have a follow up appointment 2-4 days post-procedure. At this appointment, you will have an ultrasound and we will check your incisions.    ________________________________________________________________________________________    The Two Weeks Following Your Procedure  1.  Skin Care   - Do not use any lotions, creams or powders on your incisions for 14 days or until the incisions have                 healed.   - Do not soak in a bathtub, hot tub or go swimming for 14 days or until your incisions have healed.  2.  Medications   - You may use ibuprofen or acetaminophen (e.g., Tylenol) as needed for pain or discomfort.  3.  Activity   - Do not lift over 25 pounds. After about two weeks you may resume exercise such as aerobics,                 running, tennis or weightlifting. Use your common sense and ease back into your exercise routine                 slowly.   - You may feel a cord-like tightness along the inside of your leg. Gentle stretching can be helpful.  4. Compression Hose   - Your doctor may instruct you to wear compression for longer than seven days; please follow your                 doctor's instructions. As a comfort measure, you may choose to wear compression for longer than                 required.  5.  Travel   - Do not fly in an airplane for 14 days after your procedure. If you have a long car trip planned within                 two to three weeks following your procedure, stop and walk for a few minutes every two hours.      Periodic ankle pumps during the ride may be helpful.    Six Week Appointment  - At your six-week appointment, you will see your surgeon for an exam and evaluation. This office visit     will be scheduled when you return for post-op day three return appointment.       Return to Work  1.  If you  work outside the home, you may return to work in a few days depending on the extent of your        procedure, how you tolerate it, and the type of work you perform.  2.  Paperwork: If your employer requires paperwork or you would like a letter written to your employer, please        let us know. We will complete disability type forms at no charge. Please allow five business days for forms        to be completed.            Sclerotherapy: Pre-Treatment Instructions    Time Required per Treatment Session - About 45 minutes  Please come in 15 minutes before your scheduled appointment.  30 min.  Sclerotherapy treatments last approximately 30 minutes.  5 min.    A staff member will wipe your legs off with warm water and dry them with a wash cloth.                 Then you can put your compression hose on, get dressed and check out.  10 min.  After your treatment, you will be asked to walk around for 10 minutes before you get in your car.    Medications  Five days before your appointment, discontinue aspirin (Bufferin, Anacin, etc.) and Ibuprofen (Motrin, Advil, Aleve, etc.) to reduce bruising. Resume these medications the day following the treatment.    Leg Preparation  Do not shave your legs or apply any oil, lotion or powder the night before or the day of your treatment.    Clothing  Shorts:  Bring a pair of loose, comfortable shorts to wear during your treatment (or you can choose to wear ours). Shoes: Bring comfortable shoes to accommodate the compression hose after your treatment. Do not wear flip-flops or thong-style sandals unless you have open-toe compression hose.    Photographs  Photos will be taken before each treatment. This helps monitor your progress.    Injections  The physician will inject your veins with the sclerotherapy solution chosen to meet your specific needs.    Compression Hose  Please bring your compression hose if you have them. They may also be reserved for you at our clinic. Compression hose  must be worn immediately after each sclerotherapy treatment.  The hose must be compression level 20-30, and they must be worn for 24 hours straight after your treatment.  If you have never worn compression hose before, a staff member will teach you how to put them on.             You cannot have a treatment without compression hose.               They are critical to the success of your treatment!    You may purchase your compression hose from us. We will measure you and have the hose available when you come for your treatment.    Cancellation and Rescheduling  If you need to cancel or reschedule your sclerotherapy treatment, please give our office at least 24 hour notice.    Sclerotherapy: Basic Information    What is sclerotherapy?  Sclerotherapy is a treatment for  spider  veins.  Spider  veins are small veins just under your skin that can look red, blue or purple. Most  spider  veins are only a cosmetic problem.  Spider  veins are not useful and treating them will not affect your circulation.    How does sclerotherapy work?  1.  Injections: A very small needle is used to inject a solution into the  spider  veins. The solution irritates the cells that line the vein walls. This causes the veins to collapse. The vein walls to stick together and they can no longer carry blood. Different solutions are used based on the size of the veins.  2.  Compression:  The spider veins are kept collapsed by wearing compression stockings. Your body will break down and absorb the treated veins. You wear the compression hose for 24 hours after the treatment and then for 4 more days during your waking hours only.    How does the body heal after sclerotherapy?  The process is similar to how your body heals after a bad bruise. It takes 4-6 weeks or more for the healing to be complete. When the healing is complete, the vein is no longer visible. It may take more than one treatment.    How do I get the best results?  It is important to  follow the post-sclerotherapy instructions. The best results require time and patience. The injection sites will continue to heal and fade for months after a treatment. Please discuss your expectations with your doctor to keep them realistic. Your doctor will do everything possible to meet or exceed your expectations.    How many treatments are needed?  After your initial exam, your doctor will give you an estimate of the number of treatments that may be required. It depends upon the size, type, and quantity of your  spider  veins and on the doctor's assessment, your history and expectations. You may end up needing fewer or more treatments.    How soon can I have another treatment?  Additional treatments are scheduled every 4-6 weeks to allow time for the body to respond to the previous treatment.    Common Side Effects:  Itching  The areas that were injected may itch. This is usually mild and lasts less than a day. Do not use lotions or creams on your legs until the injection sites have healed over.    Pain  It is common to have some tenderness at the injection sites. Injection of the solution can be uncomfortable, but is usually well tolerated by most patients. The tenderness is temporary, lasting 24 hours at most. Tylenol or Ibuprofen can be used, if needed, following the product directions.    Bruising  This may occur at the injections sites. Bruising may be minimized by avoiding Aspirin and Ibuprofen products for five days before each treatment session.    Hyperpigmentation  A light brown discoloration of the skin may develop along the veins in the areas injected. Approximately 20-30% of patients treated note the discoloration (which is lighter and less obvious than the veins that are being treated). The hyperpigmentation usually fades in a couple of weeks, but may take several months to a year to totally resolve. There is a 1% chance of hyperpigmentation continuing after one year.    Trapped blood  A small  amount of blood may become trapped and hardened in the veins. This may feel like a knot or cord and it may look dark blue or bruised. This is a common occurrence. You may need to return before your next treatment so this area can be drained to remove the trapped blood. This will reduce the hyperpigmentation that can occur. The chance of this occurring can be decreased with proper use of compression hose after your treatment.    Matting  Matting is the formation of new, fine  spider  veins in the area injected.  It occurs in approximately 10% of patients injected. The exact reason for this is unknown. If untreated, the matting usually resolves in 3 to 12 months, but very rarely, it can be permanent. If the matting does not fade, it can be re-injected.    Rare Side Effects:  Ulceration at injection sites  Very rarely, a small ulcer will occur at the site where a vein is injected. An ulcer can take 4 to 6 weeks to completely heal. A small scar may result.    Allergic reactions  There is a very rare incidence of an allergic reaction to the solution injected. You will be observed for such reaction and will be treated appropriately should it occur. Please inform us of any allergy history.    Pulmonary embolus/Deep vein thrombosis  This is a blood clot which moves to the lungs/a blood clot in the deep vein system. There is an extraordinarily low incidence of this complication.      SCLEROTHERAPY AFTER CARE  Immediately:  After treatment, walk for 10-15 minutes before getting in your car.  If your trip home is more than 1 hour, stop and walk around for 5-10 minutes. Avoid sitting or standing for extended periods.   First 24 hours: Wear your compression continuously, even while in bed. After the 24 hours, you may shower if you want to. Put your hose back on, unless you are going to bed. You should NOT wear compression to bed after the first 24 hours. You may fly the next day, but wear your compression.   For 5 days: Wear the  compression hose for waking hours only. You may continue to wear them longer than 5 days if you prefer.   For days 5-7: Walking is encouraged, as it promotes efficient circulation in your veins. You may do activities that raise your heart rate, but do NOT run, jog, do high impact aerobics, or weight lifting. After 7 days, no activity restrictions.  Shaving: Wait a few days to shave or apply lotion.   Bathing: Do NOT take hot baths or sit in a hot tub for 7-10 days.    For 1 year: Wear SPF 30 sunscreen on your legs when in the sun. This is very important! It helps prevent darkening of the skin at the injection sites.   Medications: You may resume your usual medications, including aspirin or ibuprofen.    Common Things to Expect       Compression must be worn for the first 24 hours and then during the day for 5-7 days.    If larger veins are treated with ultrasound-guided sclerotherapy, you will have redness, firmness, tenderness, and swelling.  This firmness and tenderness may take 3-6 months to resolve. Ibuprofen and compression hose will aid in this process.    You will have bruising that can last up to 3 weeks. Most fading of the veins will occur between 3 and 6 weeks after treatment.    You may notice brown discoloration (hyperpigmentation) at the treatment site.  This should fade with time, but will take 3 months to 1 year to fully heal.     Some treated veins may look darker because of trapped blood within the vein. This trapped blood can be removed at a minimum of 1 month following treatment. Larger veins are more likely to develop trapped blood.    It is very important for you to use at least SPF 30 sunscreen in order to help prevent the discoloration of your skin.    Migraines rarely occur following sclerotherapy, but are more likely in patients with a history of migraines.  Treat as you would any other migraine.

## 2025-06-16 NOTE — PROGRESS NOTES
June 16, 2025    Vein Procedure Recommendation    Called and spoke with patient.    Dr. Price has recommended patient to have the following vein procedure(s):     1. Left leg VNUS closure ASV, <10 Phlebectomies (medically necessary), and Ultrasound Guided Sclerotherapy (medically necessary)      Patient Pre-op Questions:  Preferred Pharmacy: [facility] [place] and [address] [place]  Anticoagulant/ASA: No  Artificial Joint or Heart Valve:  No  Open ulcer:  No  Sedation nausea: No      Patient is recommended to wear Thigh High compression hose following her procedure. Discussed compression hose. Patient has thigh high compression from 1st procedure.    Entered in patient's After Visit Summary (viewable in [employer]) written procedure instructions to review on her own (see After Visit Summary).    Next steps:    Insurance Submission  Informed patient this process could take up to 14 business days, but once approved, the patient will be contacted by our surgery scheduler to schedule the above procedure. Gave patient our surgery scheduler's information.    Informed patient to call our office regarding the status of their insurance authorization if it has been more than 3 weeks and have not heard from our surgery scheduler.    Patient is in agreement with all of the above and has no further questions at this time.    Thalia Rayo RN  [facility]  Vein Clinic

## 2025-06-16 NOTE — LETTER
6/16/2025      Nina Sommers  [address] [place] [address]      Dear Colleague,    Thank you for referring your patient, Nina Sommers, to the [facility] [place]. Please see a copy of my visit note below.    Virtual Visit Details    Type of service:  Video Visit     Originating Location (pt. Location): Home    Distant Location (provider location):  On-site  Platform used for Video Visit: [facility]          Vein Clinic Consultation Note    HPI:  Nina Sommers is a 55 year old female who was seen today in consultation for left leg aching and discomfort, heaviness on standing relieved by rest and elevation of leg.    Patient presents with complaints of lower extremity achiness and heaviness. Patient's symptoms are worse when standing for long periods of time and improve with leg elevation and frequent breaks.    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  We then undertook venous competency testing of the left lower extremity and found significant anterior accessory saphenous vein reflux 4.9 seconds with associated refluxing varicose veins down the anterolateral thigh..    The patient has pursued conservative measures with compression stockings, leg elevation, dietary measures and exercise. The symptoms are occurring despite the use of medical grade compression stockings for more than 3 months.    ROS    PHH:  No past medical history on file.     Past Surgical History:   Procedure Laterality Date     ABDOMEN SURGERY      Tummy tuck     breast lift      breast lift and tummy tuck     BREAST SURGERY      Breast lift     COLONOSCOPY N/A 11/21/2022    Procedure: COLONOSCOPY, WITH POLYPECTOMIES using cold snare and regular forceps;  Surgeon: Jeninfer Atkinson MD;  Location: [facility]        ALLERGIES:  Patient has no known allergies.    No Known Allergies    MEDS:    Current Outpatient Medications:      estradiol (CLIMARA) 0.0375 MG/24HR weekly patch, Place 1 patch onto the skin once a week, Disp: , Rfl:      scopolamine (TRANSDERM) 1 MG/3DAYS 72 hr patch, APPLY 1 PATCH TOPICALLY TO THE SKIN EVERY 72 HOURS, Disp: 10 patch, Rfl: 0     valACYclovir (VALTREX) 1000 mg tablet, Take 1,000 mg by mouth., Disp: , Rfl:     SOCIAL HABITS:    History   Smoking Status     Never   Smokeless Tobacco     Never     Social History    Substance and Sexual Activity      Alcohol use: Yes        Comment: rare      History   Drug Use No       FAMILY HISTORY:    Family History   Problem Relation Age of Onset     Myocardial Infarction Mother          from heart attack at age of 73     Diabetes Mother         type II     Family History Negative Father         macular degeneration     Breast Cancer Maternal Grandmother         in her 70's      Family History Negative Sister         2-doing well     Cancer - colorectal Other         paternal aunt,  from this in her 60-70's-dad's sister     Colon Cancer No family hx of      Results for orders placed or performed in visit on 25   US Venous Competency Left    Narrative    Table formatting from the original result was not included.  LEFT Venous Insufficiency Ultrasound (Date: 25)  LEFT Lower Extremity  Examined by:  LIAT Dangelo RVT  Indication: varicose veins with pain    BP:  151/93  HR: 79    Technique:   Supine and Reverse Trendelenburg Ultrasound of the Deep and Superficial   Veins with Valsalva and Compression Augmentation Maneuvers. Duplex Imaging   is performed utilizing gray-scale, Two-dimensional images, color-flow   imaging, Doppler waveform analysis, and Spectral doppler imaging done with   provocative maneuvers.     Incompetency Criteria:  Deep vein reflux reported when greater than 1.0 sec flow reversal.   Superficial vein reflux reported when  greater than 0.5 sec flow reversal.    vein reflux reported as greater than 0.5 sec flow reversal.    Scan Position for Deep and Superficial Insufficiency Evaluation: Reverse   Trendelenburg    Left Leg Deep Veins   Thrombus Phasic Reflux Time (sec)   CFV       -      +        -   Femoral V Prox       -      +        -   Femoral V Mid        -      +        -   Femoral V Dist       -      +        -   Popliteal V       -      +        -   PTV'S       -     Peroneal V'S       -       Left Leg Superficial Veins   AP (mm) Depth (mm) Thrombus Reflux Time (sec)   SFJ      8.0      12.2        -      1.2   AASV @ SFJ      5.1      14.6        -      3.5   AASV Prox Thigh      4.6      13.5        -      4.9          GSV Prox Thigh      4.7      18.1        -      -   GSV Mid Thigh      3.6      17.0        -      1.1   GSV Dist Thigh      4.2      16.4        -      -   GSV Knee      3.9      13.9        -      -   GSV Prox Calf      3.2      13.1        -      -   GSV Mid Calf      2.7      13.6        -      -   GSV Dist Calf      3.1      9.1        -      0.7      AP (mm) Depth (mm) Thrombus Reflux Time (sec)   GIACOMINI V Dist      NV                                  SPJ      NV                           SSV Prox Calf      2.5       8.5         -        -   SSV Mid Calf      3.0       7.4         -        -   SSV Dist Calf      3.0       6.5         -        -     Comments:   The AASV gives rise to a varicose branch measuring 3.7 mm off the proximal   thigh that courses  lateral with a reflux time of 3.7 seconds.      The GSV gives rise to a varicose branch measuring 3.7 mm off the proximal   thigh that courses medial with a reflux time of 1.3 seconds.      Perforators:  There is an incompetent  vein ( 3.5 mm) at medial   calf approximately 15 cm from medial malleolus that communicates with   varicose  veins measuring 3.3 mm that demonstrate 1.3 seconds of reflux.     The Right CFV is fully  compressible, phasic and responds to augmentation   with no evidence of DVT.       Impression:      Severe left AASV reflux with associated thigh varicose vein reflux         Reference:    Venous Doppler: (+) = Present  (-) = Absent  (D) = Decreased (NV) = Not   Visualized    Reflux: (-) Competent, (NV) = Not Visualized    Interpretation criteria:  Duration of Retrograde flow (seconds)  Category Deep Veins Superficial Veins  veins   Competent < 1.0s < 0.5s < 0.5s   Incompetent > 1.0s > 0.5s > 0.5s             VEIN CLINIC LEG DRAWING    No data recorded     ASSESSMENT: Symptomatic left lower extremity anterior accessory saphenous vein reflux with aching and heaviness on standing refractory to conservative treatment including compression greater than 3 months requiring breaks and elevation to improve symptoms.    PLAN: Left anterior accessory saphenous vein radiofrequency ablation with short catheter, discussed that the straight segment is 8 cm and it may be technically challenging.  Medically necessary ultrasound-guided sclerotherapy and phlebectomy of the associated varicose veins to help augment closure of flow through the anterior accessory saphenous vein.      Janak Price MD    7 minutes spent in direct A/V communication, 13 minutes spent in coordination of care and charting for total of 20 minutes spent today.  The longitudinal plan of care for the diagnosis(es)/condition(s) as documented were addressed during this visit. Due to the added complexity in care, I will continue to support Nina in the subsequent management and with ongoing continuity of care.      Again, thank you for allowing me to participate in the care of your patient.        Sincerely,        Janak Price MD    Electronically signed

## 2025-06-16 NOTE — PROGRESS NOTES
Virtual Visit Details    Type of service:  Video Visit     Originating Location (pt. Location): Home    Distant Location (provider location):  On-site  Platform used for Video Visit: [facility]          Vein Clinic Consultation Note    HPI:  Nina Sommers is a 55 year old female who was seen today in consultation for left leg aching and discomfort, heaviness on standing relieved by rest and elevation of leg.    Patient presents with complaints of lower extremity achiness and heaviness. Patient's symptoms are worse when standing for long periods of time and improve with leg elevation and frequent breaks.    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  We then undertook venous competency testing of the left lower extremity and found significant anterior accessory saphenous vein reflux 4.9 seconds with associated refluxing varicose veins down the anterolateral thigh..    The patient has pursued conservative measures with compression stockings, leg elevation, dietary measures and exercise. The symptoms are occurring despite the use of medical grade compression stockings for more than 3 months.    ROS    PHH:  No past medical history on file.     Past Surgical History:   Procedure Laterality Date    ABDOMEN SURGERY      Tummy tuck    breast lift      breast lift and tummy tuck    BREAST SURGERY      Breast lift    COLONOSCOPY N/A 11/21/2022    Procedure: COLONOSCOPY, WITH POLYPECTOMIES using cold snare and regular forceps;  Surgeon: Jennifer Atkinson MD;  Location:  GI       ALLERGIES:  Patient has no known allergies.    No Known Allergies    MEDS:    Current Outpatient Medications:     estradiol (CLIMARA) 0.0375 MG/24HR weekly patch, Place 1 patch onto the skin once a week, Disp: , Rfl:     scopolamine  (TRANSDERM) 1 MG/3DAYS 72 hr patch, APPLY 1 PATCH TOPICALLY TO THE SKIN EVERY 72 HOURS, Disp: 10 patch, Rfl: 0    valACYclovir (VALTREX) 1000 mg tablet, Take 1,000 mg by mouth., Disp: , Rfl:     SOCIAL HABITS:    History   Smoking Status    Never   Smokeless Tobacco    Never     Social History    Substance and Sexual Activity      Alcohol use: Yes        Comment: rare      History   Drug Use No       FAMILY HISTORY:    Family History   Problem Relation Age of Onset    Myocardial Infarction Mother          from heart attack at age of 73    Diabetes Mother         type II    Family History Negative Father         macular degeneration    Breast Cancer Maternal Grandmother         in her 70's     Family History Negative Sister         2-doing well    Cancer - colorectal Other         paternal aunt,  from this in her 60-70's-dad's sister    Colon Cancer No family hx of      Results for orders placed or performed in visit on 25   US Venous Competency Left    Narrative    Table formatting from the original result was not included.  LEFT Venous Insufficiency Ultrasound (Date: 25)  LEFT Lower Extremity  Examined by:  LIAT Dangelo RVT  Indication: varicose veins with pain    BP:  151/93  HR: 79    Technique:   Supine and Reverse Trendelenburg Ultrasound of the Deep and Superficial   Veins with Valsalva and Compression Augmentation Maneuvers. Duplex Imaging   is performed utilizing gray-scale, Two-dimensional images, color-flow   imaging, Doppler waveform analysis, and Spectral doppler imaging done with   provocative maneuvers.     Incompetency Criteria:  Deep vein reflux reported when greater than 1.0 sec flow reversal.   Superficial vein reflux reported when greater than 0.5 sec flow reversal.    vein reflux reported as greater than 0.5 sec flow reversal.    Scan Position for Deep and Superficial Insufficiency Evaluation: Reverse   Trendelenburg    Left Leg Deep Veins   Thrombus Phasic Reflux Time  (sec)   CFV       -      +        -   Femoral V Prox       -      +        -   Femoral V Mid        -      +        -   Femoral V Dist       -      +        -   Popliteal V       -      +        -   PTV'S       -     Peroneal V'S       -       Left Leg Superficial Veins   AP (mm) Depth (mm) Thrombus Reflux Time (sec)   SFJ      8.0      12.2        -      1.2   AASV @ SFJ      5.1      14.6        -      3.5   AASV Prox Thigh      4.6      13.5        -      4.9          GSV Prox Thigh      4.7      18.1        -      -   GSV Mid Thigh      3.6      17.0        -      1.1   GSV Dist Thigh      4.2      16.4        -      -   GSV Knee      3.9      13.9        -      -   GSV Prox Calf      3.2      13.1        -      -   GSV Mid Calf      2.7      13.6        -      -   GSV Dist Calf      3.1      9.1        -      0.7      AP (mm) Depth (mm) Thrombus Reflux Time (sec)   GIACOMINI V Dist      NV                                  SPJ      NV                           SSV Prox Calf      2.5       8.5         -        -   SSV Mid Calf      3.0       7.4         -        -   SSV Dist Calf      3.0       6.5         -        -     Comments:   The AASV gives rise to a varicose branch measuring 3.7 mm off the proximal   thigh that courses  lateral with a reflux time of 3.7 seconds.      The GSV gives rise to a varicose branch measuring 3.7 mm off the proximal   thigh that courses medial with a reflux time of 1.3 seconds.      Perforators:  There is an incompetent  vein ( 3.5 mm) at medial   calf approximately 15 cm from medial malleolus that communicates with   varicose  veins measuring 3.3 mm that demonstrate 1.3 seconds of reflux.     The Right CFV is fully compressible, phasic and responds to augmentation   with no evidence of DVT.       Impression:      Severe left AASV reflux with associated thigh varicose vein reflux         Reference:    Venous Doppler: (+) = Present  (-) = Absent  (D) = Decreased (NV) = Not    Visualized    Reflux: (-) Competent, (NV) = Not Visualized    Interpretation criteria:  Duration of Retrograde flow (seconds)  Category Deep Veins Superficial Veins  veins   Competent < 1.0s < 0.5s < 0.5s   Incompetent > 1.0s > 0.5s > 0.5s             VEIN CLINIC LEG DRAWING    No data recorded     ASSESSMENT: Symptomatic left lower extremity anterior accessory saphenous vein reflux with aching and heaviness on standing refractory to conservative treatment including compression greater than 3 months requiring breaks and elevation to improve symptoms.    PLAN: Left anterior accessory saphenous vein radiofrequency ablation with short catheter, discussed that the straight segment is 8 cm and it may be technically challenging.  Medically necessary ultrasound-guided sclerotherapy and phlebectomy of the associated varicose veins to help augment closure of flow through the anterior accessory saphenous vein.      Janak Price MD    7 minutes spent in direct A/V communication, 13 minutes spent in coordination of care and charting for total of 20 minutes spent today.  The longitudinal plan of care for the diagnosis(es)/condition(s) as documented were addressed during this visit. Due to the added complexity in care, I will continue to support Nina in the subsequent management and with ongoing continuity of care.

## 2025-07-26 ENCOUNTER — ANCILLARY PROCEDURE (OUTPATIENT)
Dept: MAMMOGRAPHY | Facility: CLINIC | Age: 55
End: 2025-07-26
Attending: PHYSICIAN ASSISTANT
Payer: COMMERCIAL

## 2025-07-26 DIAGNOSIS — Z12.31 VISIT FOR SCREENING MAMMOGRAM: ICD-10-CM

## 2025-07-26 PROCEDURE — 77063 BREAST TOMOSYNTHESIS BI: CPT | Mod: TC | Performed by: RADIOLOGY

## 2025-07-26 PROCEDURE — 77067 SCR MAMMO BI INCL CAD: CPT | Mod: TC | Performed by: RADIOLOGY

## 2025-08-20 ENCOUNTER — APPOINTMENT (OUTPATIENT)
Age: 55
Setting detail: DERMATOLOGY
End: 2025-08-20

## 2025-08-20 DIAGNOSIS — L57.0 ACTINIC KERATOSIS: ICD-10-CM | Status: INADEQUATELY CONTROLLED

## 2025-08-20 DIAGNOSIS — D18.0 HEMANGIOMA: ICD-10-CM

## 2025-08-20 DIAGNOSIS — L82.1 OTHER SEBORRHEIC KERATOSIS: ICD-10-CM

## 2025-08-20 DIAGNOSIS — D22 MELANOCYTIC NEVI: ICD-10-CM

## 2025-08-20 DIAGNOSIS — Z71.89 OTHER SPECIFIED COUNSELING: ICD-10-CM

## 2025-08-20 DIAGNOSIS — B00.1 HERPESVIRAL VESICULAR DERMATITIS: ICD-10-CM | Status: IMPROVED

## 2025-08-20 PROBLEM — D22.39 MELANOCYTIC NEVI OF OTHER PARTS OF FACE: Status: ACTIVE | Noted: 2025-08-20

## 2025-08-20 PROBLEM — D18.01 HEMANGIOMA OF SKIN AND SUBCUTANEOUS TISSUE: Status: ACTIVE | Noted: 2025-08-20

## 2025-08-20 PROBLEM — D22.5 MELANOCYTIC NEVI OF TRUNK: Status: ACTIVE | Noted: 2025-08-20

## 2025-08-20 PROCEDURE — ? OBSERVATION

## 2025-08-20 PROCEDURE — ? SUNSCREEN RECOMMENDATIONS

## 2025-08-20 PROCEDURE — ? COUNSELING

## 2025-08-20 PROCEDURE — ? PRESCRIPTION

## 2025-08-20 PROCEDURE — ? PRESCRIPTION MEDICATION MANAGEMENT

## 2025-08-20 PROCEDURE — ? LIQUID NITROGEN

## 2025-08-20 RX ORDER — VALACYCLOVIR HYDROCHLORIDE 1 G/1
TABLET, FILM COATED ORAL
Qty: 28 | Refills: 1 | Status: ERX | COMMUNITY
Start: 2025-08-20

## 2025-08-20 RX ADMIN — VALACYCLOVIR HYDROCHLORIDE: 1 TABLET, FILM COATED ORAL at 00:00

## 2025-08-20 ASSESSMENT — LOCATION ZONE DERM
LOCATION ZONE: FACE
LOCATION ZONE: LIP
LOCATION ZONE: NOSE
LOCATION ZONE: TRUNK

## 2025-08-20 ASSESSMENT — LOCATION DETAILED DESCRIPTION DERM
LOCATION DETAILED: MIDDLE STERNUM
LOCATION DETAILED: LEFT INFERIOR MEDIAL FOREHEAD
LOCATION DETAILED: RIGHT SUPERIOR MEDIAL MIDBACK
LOCATION DETAILED: SUPERIOR THORACIC SPINE
LOCATION DETAILED: LEFT SUPERIOR VERMILION LIP
LOCATION DETAILED: INFERIOR THORACIC SPINE
LOCATION DETAILED: NASAL ROOT
LOCATION DETAILED: LEFT LATERAL EYEBROW
LOCATION DETAILED: EPIGASTRIC SKIN

## 2025-08-20 ASSESSMENT — LOCATION SIMPLE DESCRIPTION DERM
LOCATION SIMPLE: UPPER BACK
LOCATION SIMPLE: CHEST
LOCATION SIMPLE: LEFT LIP
LOCATION SIMPLE: LEFT EYEBROW
LOCATION SIMPLE: NOSE
LOCATION SIMPLE: ABDOMEN
LOCATION SIMPLE: LEFT FOREHEAD
LOCATION SIMPLE: RIGHT LOWER BACK

## (undated) DEVICE — ENDO SNARE POLYPECTOMY OVAL 15MM LOOP SD-240U-15

## (undated) DEVICE — KIT ENDO TURNOVER/PROCEDURE W/CLEAN A SCOPE LINERS 103888

## (undated) RX ORDER — FENTANYL CITRATE 0.05 MG/ML
INJECTION, SOLUTION INTRAMUSCULAR; INTRAVENOUS
Status: DISPENSED
Start: 2022-11-21